# Patient Record
Sex: FEMALE | Race: WHITE | NOT HISPANIC OR LATINO | Employment: UNEMPLOYED | URBAN - METROPOLITAN AREA
[De-identification: names, ages, dates, MRNs, and addresses within clinical notes are randomized per-mention and may not be internally consistent; named-entity substitution may affect disease eponyms.]

---

## 2017-02-10 DIAGNOSIS — Z12.31 OTHER SCREENING MAMMOGRAM: ICD-10-CM

## 2017-05-25 ENCOUNTER — PATIENT OUTREACH (OUTPATIENT)
Dept: ADMINISTRATIVE | Facility: HOSPITAL | Age: 67
End: 2017-05-25

## 2017-05-25 NOTE — LETTER
May 25, 2017    Miltonindira Sondra  [address] [facility] [address]             [facility]  [address]  [place] [address]  Phone: [phone] Dear MsVj Kurtbettie:    [facility] is committed to your overall health.  To help you get the most out of each of your visits, we will review your information to make sure you are up to date on all of your recommended tests and/or procedures.      Dr. Bryson has found that you may be due for a tetanus vaccine, a pneumonia vaccine, a shingles vaccine, a mammogram, a pap smear, a Dexa scan to check for osteoporosis, and a Hepatitis C screening.     If you have had any of the above done at another facility, please bring the records or information with you so that your record at [facility] will be complete.  If you would like to schedule any of these, please contact me.    If you are currently taking medication, please bring it with you to your appointment for review.    Also, if you have any type of Advanced Directives, please bring them with you to your office visit so we may scan them into your chart.        If you have any questions or concerns, please don't hesitate to call.    Sincerely,        Nicole Bhatia LPN Clinical Care Coordinator  [facility]  [phone]

## 2017-06-09 ENCOUNTER — OFFICE VISIT (OUTPATIENT)
Dept: FAMILY MEDICINE | Facility: CLINIC | Age: 67
End: 2017-06-09
Payer: MEDICARE

## 2017-06-09 ENCOUNTER — TELEPHONE (OUTPATIENT)
Dept: FAMILY MEDICINE | Facility: CLINIC | Age: 67
End: 2017-06-09

## 2017-06-09 VITALS
HEIGHT: 64 IN | SYSTOLIC BLOOD PRESSURE: 130 MMHG | WEIGHT: 185.19 LBS | HEART RATE: 80 BPM | RESPIRATION RATE: 18 BRPM | DIASTOLIC BLOOD PRESSURE: 88 MMHG | BODY MASS INDEX: 31.62 KG/M2

## 2017-06-09 DIAGNOSIS — R53.83 FATIGUE, UNSPECIFIED TYPE: Primary | ICD-10-CM

## 2017-06-09 DIAGNOSIS — Z23 NEED FOR PNEUMOCOCCAL VACCINE: ICD-10-CM

## 2017-06-09 DIAGNOSIS — Z12.31 VISIT FOR SCREENING MAMMOGRAM: ICD-10-CM

## 2017-06-09 DIAGNOSIS — M81.0 AGE RELATED OSTEOPOROSIS, UNSPECIFIED PATHOLOGICAL FRACTURE PRESENCE: ICD-10-CM

## 2017-06-09 DIAGNOSIS — Z11.59 ENCOUNTER FOR HEPATITIS C SCREENING TEST FOR LOW RISK PATIENT: ICD-10-CM

## 2017-06-09 LAB — TSH SERPL DL<=0.005 MIU/L-ACNC: 1.78 UIU/ML

## 2017-06-09 PROCEDURE — 99999 PR PBB SHADOW E&M-EST. PATIENT-LVL III: CPT | Mod: PBBFAC,,, | Performed by: FAMILY MEDICINE

## 2017-06-09 PROCEDURE — 84443 ASSAY THYROID STIM HORMONE: CPT

## 2017-06-09 PROCEDURE — 86803 HEPATITIS C AB TEST: CPT

## 2017-06-09 PROCEDURE — 1126F AMNT PAIN NOTED NONE PRSNT: CPT | Mod: S$GLB,,, | Performed by: FAMILY MEDICINE

## 2017-06-09 PROCEDURE — G0009 ADMIN PNEUMOCOCCAL VACCINE: HCPCS | Mod: S$GLB,,, | Performed by: FAMILY MEDICINE

## 2017-06-09 PROCEDURE — 99214 OFFICE O/P EST MOD 30 MIN: CPT | Mod: S$GLB,,, | Performed by: FAMILY MEDICINE

## 2017-06-09 PROCEDURE — 90670 PCV13 VACCINE IM: CPT | Mod: S$GLB,,, | Performed by: FAMILY MEDICINE

## 2017-06-09 PROCEDURE — 1159F MED LIST DOCD IN RCRD: CPT | Mod: S$GLB,,, | Performed by: FAMILY MEDICINE

## 2017-06-09 NOTE — PROGRESS NOTES
Subjective:       Patient ID: Raffi Amaro is a 67 y.o. female.    Chief Complaint: Follow-up    HPI  67 year old female is here for f/u. She says she is feeling great. She has been running and exercising well. She says that in spite of that she is not losing any weight. She feels a little fatigue and is worried about her thyroid.     She has some work to do to get up to date in her health maint. She hasn't had a mammo in years. 19 years since her last pap. No colonoscopy.    PMH, PSH, ALLERGIES, SH, FH reviewed in nurse's notes above  Medications reconciled in the nurse's notes      Review of Systems   Constitutional: Positive for fatigue and unexpected weight change. Negative for chills and fever.   HENT: Negative for congestion, ear pain, postnasal drip, rhinorrhea, sore throat and trouble swallowing.    Eyes: Negative for redness and itching.   Respiratory: Negative for cough, shortness of breath and wheezing.    Cardiovascular: Negative for chest pain and palpitations.   Gastrointestinal: Negative for abdominal pain, diarrhea, nausea and vomiting.   Genitourinary: Negative for dysuria and frequency.   Skin: Negative for rash.   Neurological: Negative for weakness and headaches.       Objective:      Physical Exam   Constitutional: She is oriented to person, place, and time. She appears well-developed. No distress.   HENT:   Head: Normocephalic and atraumatic.   Eyes: Conjunctivae are normal. Pupils are equal, round, and reactive to light.   Neck: Normal range of motion. Neck supple. No thyromegaly present.   Cardiovascular: Normal rate, regular rhythm, normal heart sounds and intact distal pulses.    Pulmonary/Chest: Effort normal and breath sounds normal. No respiratory distress. She has no wheezes.   Abdominal: Soft. Bowel sounds are normal. There is no tenderness.   Musculoskeletal: Normal range of motion. She exhibits no edema.   Lymphadenopathy:     She has no cervical adenopathy.   Neurological: She is  alert and oriented to person, place, and time.   Skin: Skin is warm and dry. No rash noted.   Psychiatric: She has a normal mood and affect. Her behavior is normal.   Nursing note and vitals reviewed.       Assessment/Plan:       Raffi was seen today for follow-up.    Diagnoses and all orders for this visit:    Fatigue, unspecified type  -     TSH; Future    Encounter for hepatitis C screening test for low risk patient  -     Hepatitis C antibody; Future    Visit for screening mammogram  -     Mammo Digital Screening Bilateral With CAD; Future    Age related osteoporosis, unspecified pathological fracture presence  -     DXA Bone Density Spine And Hip; Future    Need for pneumococcal vaccine  -     Pneumococcal Conjugate Vaccine (13 Valent) (IM)    RTC if condition acutely worsens or any other concerns, otherwise RTC as scheduled

## 2017-06-09 NOTE — TELEPHONE ENCOUNTER
Patient would like to have mmg and dexa scan done at [facility]. Orders faxed, patient will call to schedule. Phone number provided to patient.

## 2017-06-12 ENCOUNTER — TELEPHONE (OUTPATIENT)
Dept: FAMILY MEDICINE | Facility: CLINIC | Age: 67
End: 2017-06-12

## 2017-06-12 LAB — HCV AB SERPL QL IA: NEGATIVE

## 2017-06-12 NOTE — TELEPHONE ENCOUNTER
----- Message from Yahaira Bryson MD sent at 6/11/2017 11:25 PM CDT -----  Please notify patient that results have returned, and they are normal. The patient can call with any questions. Otherwise, have them return for next visit as previously scheduled.

## 2017-12-27 ENCOUNTER — IMMUNIZATION (OUTPATIENT)
Dept: FAMILY MEDICINE | Facility: CLINIC | Age: 67
End: 2017-12-27
Payer: MEDICARE

## 2017-12-27 PROCEDURE — 90662 IIV NO PRSV INCREASED AG IM: CPT | Mod: S$GLB,,, | Performed by: FAMILY MEDICINE

## 2017-12-27 PROCEDURE — G0008 ADMIN INFLUENZA VIRUS VAC: HCPCS | Mod: S$GLB,,, | Performed by: FAMILY MEDICINE

## 2018-02-09 DIAGNOSIS — Z12.11 COLON CANCER SCREENING: ICD-10-CM

## 2018-02-23 ENCOUNTER — OFFICE VISIT (OUTPATIENT)
Dept: FAMILY MEDICINE | Facility: CLINIC | Age: 68
End: 2018-02-23
Payer: MEDICARE

## 2018-02-23 VITALS
BODY MASS INDEX: 29.51 KG/M2 | SYSTOLIC BLOOD PRESSURE: 118 MMHG | DIASTOLIC BLOOD PRESSURE: 82 MMHG | HEART RATE: 80 BPM | HEIGHT: 66 IN | TEMPERATURE: 98 F | RESPIRATION RATE: 18 BRPM | WEIGHT: 183.63 LBS

## 2018-02-23 DIAGNOSIS — J01.90 ACUTE SINUSITIS, RECURRENCE NOT SPECIFIED, UNSPECIFIED LOCATION: Primary | ICD-10-CM

## 2018-02-23 PROCEDURE — 3008F BODY MASS INDEX DOCD: CPT | Mod: S$GLB,,, | Performed by: FAMILY MEDICINE

## 2018-02-23 PROCEDURE — 99213 OFFICE O/P EST LOW 20 MIN: CPT | Mod: S$GLB,,, | Performed by: FAMILY MEDICINE

## 2018-02-23 PROCEDURE — 99999 PR PBB SHADOW E&M-EST. PATIENT-LVL III: CPT | Mod: PBBFAC,,, | Performed by: FAMILY MEDICINE

## 2018-02-23 PROCEDURE — 1126F AMNT PAIN NOTED NONE PRSNT: CPT | Mod: S$GLB,,, | Performed by: FAMILY MEDICINE

## 2018-02-23 PROCEDURE — 1159F MED LIST DOCD IN RCRD: CPT | Mod: S$GLB,,, | Performed by: FAMILY MEDICINE

## 2018-02-23 RX ORDER — CETIRIZINE HYDROCHLORIDE 10 MG/1
10 TABLET ORAL DAILY
Qty: 30 TABLET | Refills: 5 | Status: SHIPPED | OUTPATIENT
Start: 2018-02-23 | End: 2019-01-09

## 2018-02-23 RX ORDER — AZITHROMYCIN 250 MG/1
TABLET, FILM COATED ORAL
Qty: 6 TABLET | Refills: 0 | Status: SHIPPED | OUTPATIENT
Start: 2018-02-23 | End: 2018-03-09

## 2018-02-23 RX ORDER — BENZONATATE 200 MG/1
200 CAPSULE ORAL 3 TIMES DAILY PRN
Qty: 20 CAPSULE | Refills: 2 | Status: SHIPPED | OUTPATIENT
Start: 2018-02-23 | End: 2018-03-02

## 2018-02-23 NOTE — PROGRESS NOTES
Chief complaint: Sinus congestion, cough    History of present illness: Pt is 68 y.o. female complaints of sinus congestion, facial pressure, sore throat, ear ache.  Patient states glands are swollen and neck.  Patient has a cough.  This started 5 days ago.  Patient denies chest pain, shortness of breath, fever.  Patient has itchy watery eyes, itchy scratchy throat.  The patient complains of decreased hearing.  Cough is nonproductive    Review of systems:  Constitutional-no weight loss, weight gain  HEENT-allergy symptoms such as itchy watery eyes, post nasal drip, itchy palate, come and go.  Respiratory-no wheezing, see history of present illness  Neurological-no weakness or numbness    Past medical history, family history, social history-same as note dated today    Medications-all reviewed and verified in nurses notes.    Physical exam: Vital signs-reviewed and verified in nurses notes  Gen.-alert, oriented, no apparent distress.  Coughing.  Head-positive facial tenderness over the frontal and maxillary sinuses  Eyes: Pupils equal round reactive to light and accommodation, extraocular muscles intact, conjunctiva clear  Ears: Tympanic membranes are clear and mobile, no fluid present.  Nose: Injected mucous membranes, erythematous, mucopurulent discharge  Throat:  Injected red streaky mucosa,  tonsils normal  Neck: Shotty, tender anterior lymphadenopathy  Heart: Regular rate and rhythm, no murmurs, rubs or gallops  Lungs:Lungs were clear to auscultation and percussion, and with normal diaphragmatic excursion. No wheezes or rales were noted.     Assessment/Plan:   Acute sinusitis, recurrence not specified, unspecified location  -     azithromycin (Z-YULIA) 250 MG tablet; 2 po day 1, then 1 po q day  Dispense: 6 tablet; Refill: 0  -     cetirizine (ZYRTEC) 10 MG tablet; Take 1 tablet (10 mg total) by mouth once daily.  Dispense: 30 tablet; Refill: 5  -     benzonatate (TESSALON) 200 MG capsule; Take 1 capsule (200 mg  total) by mouth 3 (three) times daily as needed for Cough.  Dispense: 20 capsule; Refill: 2      Sinusitis, acute  - azithromycin (ZITHROMAX) 500 MG tablet; Take 1 tablet (500 mg total) by mouth once daily.  - cetirizine (ZYRTEC) 10 MG tablet; Take 1 tablet (10 mg total) by mouth once daily.  - diphenhydrAMINE (BENADRYL) 25 mg capsule; Take 1 each (25 mg total) by mouth nightly as needed for Itching.  - methylPREDNISolone acetate injection 60 mg; Inject 1.5 mLs (60 mg total) into the muscle one time.    Simply saline nasal lavage q.2 to 3 hours p.r.n.  Avoid dust, allergens, other sinus irritants.  Handwashing technique discussed to prevent spreading germs.

## 2018-03-09 ENCOUNTER — OFFICE VISIT (OUTPATIENT)
Dept: FAMILY MEDICINE | Facility: CLINIC | Age: 68
End: 2018-03-09
Payer: MEDICARE

## 2018-03-09 ENCOUNTER — APPOINTMENT (OUTPATIENT)
Dept: RADIOLOGY | Facility: CLINIC | Age: 68
End: 2018-03-09
Attending: FAMILY MEDICINE
Payer: MEDICARE

## 2018-03-09 VITALS
SYSTOLIC BLOOD PRESSURE: 136 MMHG | RESPIRATION RATE: 20 BRPM | DIASTOLIC BLOOD PRESSURE: 86 MMHG | HEIGHT: 66 IN | TEMPERATURE: 98 F | WEIGHT: 184.5 LBS | HEART RATE: 72 BPM | BODY MASS INDEX: 29.65 KG/M2

## 2018-03-09 DIAGNOSIS — E78.2 ELEVATED TRIGLYCERIDES WITH HIGH CHOLESTEROL: ICD-10-CM

## 2018-03-09 DIAGNOSIS — Z01.419 WELL WOMAN EXAM: ICD-10-CM

## 2018-03-09 DIAGNOSIS — R05.9 COUGH: Primary | ICD-10-CM

## 2018-03-09 DIAGNOSIS — R05.9 COUGH: ICD-10-CM

## 2018-03-09 DIAGNOSIS — Z78.0 POST-MENOPAUSAL: ICD-10-CM

## 2018-03-09 DIAGNOSIS — Z13.220 SCREENING, LIPID: ICD-10-CM

## 2018-03-09 LAB
ALBUMIN SERPL BCP-MCNC: 4.2 G/DL
ALP SERPL-CCNC: 98 U/L
ALT SERPL W/O P-5'-P-CCNC: 28 U/L
ANION GAP SERPL CALC-SCNC: 10 MMOL/L
AST SERPL-CCNC: 31 U/L
BASOPHILS # BLD AUTO: 0.03 K/UL
BASOPHILS NFR BLD: 0.6 %
BILIRUB SERPL-MCNC: 1 MG/DL
BUN SERPL-MCNC: 8 MG/DL
CALCIUM SERPL-MCNC: 9.5 MG/DL
CHLORIDE SERPL-SCNC: 106 MMOL/L
CHOLEST SERPL-MCNC: 200 MG/DL
CHOLEST/HDLC SERPL: 3.8 {RATIO}
CO2 SERPL-SCNC: 27 MMOL/L
CREAT SERPL-MCNC: 0.9 MG/DL
DIFFERENTIAL METHOD: NORMAL
EOSINOPHIL # BLD AUTO: 0.1 K/UL
EOSINOPHIL NFR BLD: 2.9 %
ERYTHROCYTE [DISTWIDTH] IN BLOOD BY AUTOMATED COUNT: 13 %
EST. GFR  (AFRICAN AMERICAN): >60 ML/MIN/1.73 M^2
EST. GFR  (NON AFRICAN AMERICAN): >60 ML/MIN/1.73 M^2
GLUCOSE SERPL-MCNC: 95 MG/DL
HCT VFR BLD AUTO: 42.9 %
HDLC SERPL-MCNC: 53 MG/DL
HDLC SERPL: 26.5 %
HGB BLD-MCNC: 14.1 G/DL
LDLC SERPL CALC-MCNC: 121.6 MG/DL
LYMPHOCYTES # BLD AUTO: 1.5 K/UL
LYMPHOCYTES NFR BLD: 30 %
MCH RBC QN AUTO: 28.7 PG
MCHC RBC AUTO-ENTMCNC: 32.9 G/DL
MCV RBC AUTO: 87 FL
MONOCYTES # BLD AUTO: 0.4 K/UL
MONOCYTES NFR BLD: 8.5 %
NEUTROPHILS # BLD AUTO: 2.8 K/UL
NEUTROPHILS NFR BLD: 58 %
NONHDLC SERPL-MCNC: 147 MG/DL
PLATELET # BLD AUTO: 235 K/UL
PMV BLD AUTO: 10.7 FL
POTASSIUM SERPL-SCNC: 4 MMOL/L
PROT SERPL-MCNC: 7.3 G/DL
RBC # BLD AUTO: 4.92 M/UL
SODIUM SERPL-SCNC: 143 MMOL/L
TRIGL SERPL-MCNC: 127 MG/DL
WBC # BLD AUTO: 4.83 K/UL

## 2018-03-09 PROCEDURE — 85025 COMPLETE CBC W/AUTO DIFF WBC: CPT

## 2018-03-09 PROCEDURE — 71046 X-RAY EXAM CHEST 2 VIEWS: CPT | Mod: TC,PO

## 2018-03-09 PROCEDURE — 3075F SYST BP GE 130 - 139MM HG: CPT | Mod: S$GLB,,, | Performed by: FAMILY MEDICINE

## 2018-03-09 PROCEDURE — 3079F DIAST BP 80-89 MM HG: CPT | Mod: S$GLB,,, | Performed by: FAMILY MEDICINE

## 2018-03-09 PROCEDURE — 36415 COLL VENOUS BLD VENIPUNCTURE: CPT | Mod: S$GLB,,, | Performed by: FAMILY MEDICINE

## 2018-03-09 PROCEDURE — 99999 PR PBB SHADOW E&M-EST. PATIENT-LVL III: CPT | Mod: PBBFAC,,, | Performed by: FAMILY MEDICINE

## 2018-03-09 PROCEDURE — 99214 OFFICE O/P EST MOD 30 MIN: CPT | Mod: S$GLB,,, | Performed by: FAMILY MEDICINE

## 2018-03-09 PROCEDURE — 80061 LIPID PANEL: CPT

## 2018-03-09 PROCEDURE — 71046 X-RAY EXAM CHEST 2 VIEWS: CPT | Mod: 26,,, | Performed by: RADIOLOGY

## 2018-03-09 PROCEDURE — 80053 COMPREHEN METABOLIC PANEL: CPT

## 2018-03-09 RX ORDER — FLUTICASONE PROPIONATE 50 MCG
2 SPRAY, SUSPENSION (ML) NASAL DAILY
Qty: 1 BOTTLE | Refills: 4 | Status: SHIPPED | OUTPATIENT
Start: 2018-03-09 | End: 2018-04-08

## 2018-03-09 RX ORDER — AZELASTINE 1 MG/ML
1-2 SPRAY, METERED NASAL 2 TIMES DAILY
Qty: 30 ML | Refills: 5 | Status: SHIPPED | OUTPATIENT
Start: 2018-03-09 | End: 2019-01-09

## 2018-03-09 RX ORDER — BENZONATATE 200 MG/1
CAPSULE ORAL
COMMUNITY
Start: 2018-03-03 | End: 2019-01-09

## 2018-03-09 NOTE — PROGRESS NOTES
Subjective:       Patient ID: Raffi Amaro is a 68 y.o. female.    Chief Complaint: Follow-up (cough)    HPI  68 year old female comes in with c/o cough. She is not a smoker and has never been a smoker. She started with this months ago. She was seen by Dr. Brandt and was given azithromycin and allergy meds for a 'pink throat.' She did have a wheeze which has improved. She can't take cough drops.    She says that she has been caring for her brother and has been putting herself to the back burner. She is agitated today and says this is why her BP is high.    PMH, PSH, ALLERGIES, SH, FH reviewed in nurse's notes above  Medications reconciled in the nurse's notes      Review of Systems   Constitutional: Negative for chills and fever.   HENT: Negative for congestion, ear pain, postnasal drip, rhinorrhea, sore throat and trouble swallowing.    Eyes: Negative for redness and itching.   Respiratory: Positive for cough. Negative for shortness of breath and wheezing.    Cardiovascular: Negative for chest pain and palpitations.   Gastrointestinal: Negative for abdominal pain, diarrhea, nausea and vomiting.   Genitourinary: Negative for dysuria and frequency.   Skin: Negative for rash.   Neurological: Negative for weakness and headaches.       Objective:      Physical Exam   Constitutional: She is oriented to person, place, and time. She appears well-developed. No distress.   HENT:   Head: Normocephalic and atraumatic.   Eyes: Conjunctivae are normal. Pupils are equal, round, and reactive to light.   Neck: Normal range of motion. Neck supple. No thyromegaly present.   Cardiovascular: Normal rate, regular rhythm, normal heart sounds and intact distal pulses.    Pulmonary/Chest: Effort normal and breath sounds normal. No respiratory distress. She has no wheezes.   Abdominal: Soft. Bowel sounds are normal. There is no tenderness.   Musculoskeletal: Normal range of motion. She exhibits no edema.   Lymphadenopathy:     She has no  cervical adenopathy.   Neurological: She is alert and oriented to person, place, and time.   Skin: Skin is warm and dry. No rash noted.   Psychiatric: She has a normal mood and affect. Her behavior is normal.   Nursing note and vitals reviewed.       Assessment/Plan:       Problem List Items Addressed This Visit     None      Visit Diagnoses     Cough    -  Primary    Relevant Medications    fluticasone (FLONASE) 50 mcg/actuation nasal spray    azelastine (ASTELIN) 137 mcg (0.1 %) nasal spray    Other Relevant Orders    X-Ray Chest PA And Lateral    CBC auto differential    Well woman exam        Relevant Orders    Comprehensive metabolic panel    Screening, lipid        Elevated triglycerides with high cholesterol        Relevant Orders    Lipid panel      RTC if condition acutely worsens or any other concerns, otherwise RTC as scheduled    Will monitor BPs - if elevated this week will let me know. We have discussed that she may need to be treated. Will watch diet and watch stressors for now.    CXR okay.

## 2018-03-16 ENCOUNTER — TELEPHONE (OUTPATIENT)
Dept: FAMILY MEDICINE | Facility: CLINIC | Age: 68
End: 2018-03-16

## 2018-03-16 ENCOUNTER — CLINICAL SUPPORT (OUTPATIENT)
Dept: FAMILY MEDICINE | Facility: CLINIC | Age: 68
End: 2018-03-16
Payer: MEDICARE

## 2018-03-16 NOTE — TELEPHONE ENCOUNTER
Please let her know that this is borderline.  I will NOT put her on a BP med at this point, BUT we need to keep an eye on it like we disucssed.  mh

## 2018-04-05 ENCOUNTER — TELEPHONE (OUTPATIENT)
Dept: FAMILY MEDICINE | Facility: CLINIC | Age: 68
End: 2018-04-05

## 2018-04-05 NOTE — TELEPHONE ENCOUNTER
Patient states that she is coughing excessively,running a fever,weakness states that she thought she was walking to the bathroom ended up on the floor.Patient states that  told her next time this happens to just come in ,however patient states that she can not drive right now .Patient would like to come in on 4/6/2018 if possible.Please review and advise.Thank you.

## 2018-04-06 ENCOUNTER — OFFICE VISIT (OUTPATIENT)
Dept: FAMILY MEDICINE | Facility: CLINIC | Age: 68
End: 2018-04-06
Payer: MEDICARE

## 2018-04-06 VITALS
HEART RATE: 84 BPM | WEIGHT: 181.19 LBS | SYSTOLIC BLOOD PRESSURE: 122 MMHG | RESPIRATION RATE: 18 BRPM | DIASTOLIC BLOOD PRESSURE: 80 MMHG | BODY MASS INDEX: 29.12 KG/M2 | HEIGHT: 66 IN

## 2018-04-06 DIAGNOSIS — J06.9 UPPER RESPIRATORY TRACT INFECTION, UNSPECIFIED TYPE: Primary | ICD-10-CM

## 2018-04-06 PROCEDURE — 99999 PR PBB SHADOW E&M-EST. PATIENT-LVL III: CPT | Mod: PBBFAC,,, | Performed by: FAMILY MEDICINE

## 2018-04-06 PROCEDURE — 99213 OFFICE O/P EST LOW 20 MIN: CPT | Mod: S$GLB,,, | Performed by: FAMILY MEDICINE

## 2018-04-06 PROCEDURE — 3079F DIAST BP 80-89 MM HG: CPT | Mod: CPTII,S$GLB,, | Performed by: FAMILY MEDICINE

## 2018-04-06 PROCEDURE — 3074F SYST BP LT 130 MM HG: CPT | Mod: CPTII,S$GLB,, | Performed by: FAMILY MEDICINE

## 2018-04-06 RX ORDER — BENZONATATE 100 MG/1
100 CAPSULE ORAL 3 TIMES DAILY PRN
Qty: 30 CAPSULE | Refills: 0 | Status: SHIPPED | OUTPATIENT
Start: 2018-04-06 | End: 2018-05-06

## 2018-04-06 RX ORDER — PROMETHAZINE HYDROCHLORIDE AND CODEINE PHOSPHATE 6.25; 1 MG/5ML; MG/5ML
5 SOLUTION ORAL EVERY 4 HOURS PRN
Qty: 120 ML | Refills: 0 | Status: SHIPPED | OUTPATIENT
Start: 2018-04-06 | End: 2018-04-16

## 2018-04-06 NOTE — PROGRESS NOTES
Subjective:       Patient ID: Raffi Amaro is a 68 y.o. female.    Chief Complaint: Nasal Congestion; Sore Throat; and Cough    HPI  68 year old female comes in after a fall yesterday. She says that she was sitting on the front porch and had a lightheaded feeling. She got up to go to the bathroom and felt like she was off balanced. She heard herself fall. She doesn't think she completely lost consciousness. She was able to get up quickly and had no confusion afterwards.     She also has some congestion. She has stopped using her nighttime nasal spray recently with her 's hospitalization but has been taking her AM nasal spray.    She has a productive cough but no fevers.     She has been checking her pulse ox and blood pressures as well as heart rates - these have been normal.    PMH, PSH, ALLERGIES, SH, FH reviewed in nurse's notes above  Medications reconciled in the nurse's notes      Review of Systems   Constitutional: Negative for chills and fever.   HENT: Positive for congestion and sore throat. Negative for ear pain, postnasal drip, rhinorrhea and trouble swallowing.    Eyes: Negative for redness and itching.   Respiratory: Positive for cough. Negative for shortness of breath and wheezing.    Cardiovascular: Negative for chest pain and palpitations.   Gastrointestinal: Negative for abdominal pain, diarrhea, nausea and vomiting.   Genitourinary: Negative for dysuria and frequency.   Skin: Negative for rash.   Neurological: Positive for light-headedness. Negative for weakness and headaches.       Objective:      Physical Exam   Constitutional: She is oriented to person, place, and time. She appears well-developed. No distress.   HENT:   Head: Normocephalic and atraumatic.   Nasal congestion   Eyes: Conjunctivae are normal. Pupils are equal, round, and reactive to light.   Neck: Normal range of motion. Neck supple. No thyromegaly present.   Cardiovascular: Normal rate, regular rhythm, normal heart  sounds and intact distal pulses.    Pulmonary/Chest: Effort normal and breath sounds normal. No respiratory distress. She has no wheezes.   Abdominal: Soft. Bowel sounds are normal. There is no tenderness.   Musculoskeletal: Normal range of motion. She exhibits no edema.   Lymphadenopathy:     She has no cervical adenopathy.   Neurological: She is alert and oriented to person, place, and time.   Skin: Skin is warm and dry. No rash noted.   Psychiatric: She has a normal mood and affect. Her behavior is normal.   Nursing note and vitals reviewed.       Assessment/Plan:       Problem List Items Addressed This Visit     None      Visit Diagnoses     Upper respiratory tract infection, unspecified type    -  Primary    Relevant Medications    benzonatate (TESSALON) 100 MG capsule    promethazine-codeine 6.25-10 mg/5 ml (PHENERGAN WITH CODEINE) 6.25-10 mg/5 mL syrup      claritin d for 5 days, continue nose sprays.  Cough meds to pharmacy.    RTC if condition acutely worsens or any other concerns, otherwise RTC as scheduled

## 2018-04-09 ENCOUNTER — TELEPHONE (OUTPATIENT)
Dept: FAMILY MEDICINE | Facility: CLINIC | Age: 68
End: 2018-04-09

## 2018-04-09 NOTE — TELEPHONE ENCOUNTER
----- Message from Alejandra Gamez sent at 2018  9:59 AM CDT -----  Contact: Self  Raffi Amaro  MRN: [id number]  : 1950  PCP: Yahaira Bryson  Home Phone      [phone]  Work Phone      Not on file.  Mobile          [phone]    MESSAGE:   Calling to let Dr. Bryson know that she is feeling much better and still taking her meds.  Dr. Bryson told her to call in and let her know how she was doing today.    Phone:  [phone]

## 2019-01-09 ENCOUNTER — HOSPITAL ENCOUNTER (OUTPATIENT)
Dept: RADIOLOGY | Facility: HOSPITAL | Age: 69
Discharge: HOME | End: 2019-01-09
Attending: FAMILY MEDICINE
Payer: MEDICARE

## 2019-01-09 ENCOUNTER — OFFICE VISIT (OUTPATIENT)
Dept: FAMILY MEDICINE | Facility: CLINIC | Age: 69
End: 2019-01-09
Payer: MEDICARE

## 2019-01-09 VITALS — HEART RATE: 74 BPM | RESPIRATION RATE: 16 BRPM | SYSTOLIC BLOOD PRESSURE: 136 MMHG | DIASTOLIC BLOOD PRESSURE: 82 MMHG

## 2019-01-09 DIAGNOSIS — M79.671 RIGHT FOOT PAIN: ICD-10-CM
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
## 2019-01-09 PROCEDURE — 99213 OFFICE O/P EST LOW 20 MIN: CPT | Mod: 25,S$GLB,, | Performed by: FAMILY MEDICINE

## 2019-01-09 PROCEDURE — G0008 FLU VACCINE - HIGH DOSE (65+) PRESERVATIVE FREE IM: ICD-10-PCS | Mod: S$GLB,,, | Performed by: FAMILY MEDICINE

## 2019-01-09 PROCEDURE — 3079F DIAST BP 80-89 MM HG: CPT | Mod: CPTII,S$GLB,, | Performed by: FAMILY MEDICINE

## 2019-01-09 PROCEDURE — 99999 PR PBB SHADOW E&M-EST. PATIENT-LVL II: CPT | Mod: PBBFAC,,, | Performed by: FAMILY MEDICINE

## 2019-01-09 PROCEDURE — 90662 IIV NO PRSV INCREASED AG IM: CPT | Mod: S$GLB,,, | Performed by: FAMILY MEDICINE

## 2019-01-09 PROCEDURE — G0008 ADMIN INFLUENZA VIRUS VAC: HCPCS | Mod: S$GLB,,, | Performed by: FAMILY MEDICINE

## 2019-01-09 PROCEDURE — 3079F PR MOST RECENT DIASTOLIC BLOOD PRESSURE 80-89 MM HG: ICD-10-PCS | Mod: CPTII,S$GLB,, | Performed by: FAMILY MEDICINE

## 2019-01-09 PROCEDURE — 90662 FLU VACCINE - HIGH DOSE (65+) PRESERVATIVE FREE IM: ICD-10-PCS | Mod: S$GLB,,, | Performed by: FAMILY MEDICINE

## 2019-01-09 PROCEDURE — 1101F PT FALLS ASSESS-DOCD LE1/YR: CPT | Mod: CPTII,S$GLB,, | Performed by: FAMILY MEDICINE

## 2019-01-09 PROCEDURE — 3075F SYST BP GE 130 - 139MM HG: CPT | Mod: CPTII,S$GLB,, | Performed by: FAMILY MEDICINE

## 2019-01-09 PROCEDURE — 99999 PR PBB SHADOW E&M-EST. PATIENT-LVL II: ICD-10-PCS | Mod: PBBFAC,,, | Performed by: FAMILY MEDICINE

## 2019-01-09 PROCEDURE — 3075F PR MOST RECENT SYSTOLIC BLOOD PRESS GE 130-139MM HG: ICD-10-PCS | Mod: CPTII,S$GLB,, | Performed by: FAMILY MEDICINE

## 2019-01-09 PROCEDURE — 1101F PR PT FALLS ASSESS DOC 0-1 FALLS W/OUT INJ PAST YR: ICD-10-PCS | Mod: CPTII,S$GLB,, | Performed by: FAMILY MEDICINE

## 2019-01-09 PROCEDURE — 99213 PR OFFICE/OUTPT VISIT, EST, LEVL III, 20-29 MIN: ICD-10-PCS | Mod: 25,S$GLB,, | Performed by: FAMILY MEDICINE

## 2019-01-09 PROCEDURE — 73630 X-RAY EXAM OF FOOT: CPT | Mod: TC,RT

## 2019-01-09 NOTE — PROGRESS NOTES
Subjective:       Patient ID: Raffi Amaro is a 68 y.o. female.    Chief Complaint: No chief complaint on file.    Pt is a 68 y.o. female who presents for evaluation and management of   Encounter Diagnoses   Name Primary?    Fall, initial encounter Yes    Right foot pain    .Fell 2 days ago from ladder. About 2 foot fall. Right foot got caught in ladder and she fell on left side.   She is having pain and swelling dorsum right foot since     Doing well on current meds. Denies any side effects. Prevention is up to date.  Review of Systems   Musculoskeletal: Positive for gait problem and joint swelling.       Objective:      Physical Exam   Constitutional: She is oriented to person, place, and time. She appears well-developed and well-nourished.   HENT:   Head: Normocephalic and atraumatic.   Right Ear: External ear normal.   Left Ear: External ear normal.   Nose: Nose normal.   Eyes: EOM are normal. Pupils are equal, round, and reactive to light.   Neck: Normal range of motion. Neck supple. No JVD present. No tracheal deviation present. No thyromegaly present.   Cardiovascular: Normal rate.   Pulmonary/Chest: Effort normal. No respiratory distress.   Abdominal: Soft.   Musculoskeletal: Normal range of motion. She exhibits tenderness. She exhibits no edema.   No ankle pain with ROM  TTP and swelling over dorsum of right foot.     Lymphadenopathy:     She has no cervical adenopathy.   Neurological: She is alert and oriented to person, place, and time.   Skin: Skin is warm and dry. No rash noted. No erythema. No pallor.   Psychiatric: She has a normal mood and affect. Her behavior is normal. Judgment and thought content normal.       Assessment:       1. Fall, initial encounter    2. Right foot pain        Plan:   Diagnoses and all orders for this visit:    Fall, initial encounter  -     X-Ray Foot Complete Right; Future    Right foot pain  -     X-Ray Foot Complete Right; Future    Other orders  -     Influenza -  High Dose (65+) (PF) (IM)      Problem List Items Addressed This Visit     None      Visit Diagnoses     Fall, initial encounter    -  Primary    Relevant Orders    X-Ray Foot Complete Right    Right foot pain        Relevant Orders    X-Ray Foot Complete Right      ICE,  Elevation.  Will f/u xray   She needs crutches. She can't bare weight on foot     No Follow-up on file.

## 2019-01-10 ENCOUNTER — TELEPHONE (OUTPATIENT)
Dept: FAMILY MEDICINE | Facility: CLINIC | Age: 69
End: 2019-01-10

## 2019-01-10 DIAGNOSIS — S93.602A SPRAIN OF LEFT FOOT, INITIAL ENCOUNTER: Primary | ICD-10-CM

## 2019-01-10 NOTE — TELEPHONE ENCOUNTER
Patient requesting crutches to be ordered----- Message from Garret Cochran MD sent at 1/9/2019  6:23 PM CST -----  Test results normal  Please let her know this is a foot sprain. No broken bones. Thanks

## 2019-01-10 NOTE — TELEPHONE ENCOUNTER
Please addend last office, must state patient needs crutches to ambulate along with valid diagnosis.       Once note completed, will fax order, registration, and note to [facility].    [facility]  Phone: [phone]  Fax: [phone]

## 2019-01-15 ENCOUNTER — TELEPHONE (OUTPATIENT)
Dept: FAMILY MEDICINE | Facility: CLINIC | Age: 69
End: 2019-01-15

## 2019-01-15 DIAGNOSIS — M81.0 AGE-RELATED OSTEOPOROSIS WITHOUT CURRENT PATHOLOGICAL FRACTURE: ICD-10-CM

## 2019-01-15 RX ORDER — ALENDRONATE SODIUM 70 MG/1
70 TABLET ORAL
Qty: 4 TABLET | Refills: 11 | Status: SHIPPED | OUTPATIENT
Start: 2019-01-15 | End: 2019-01-28 | Stop reason: SDUPTHER

## 2019-01-15 NOTE — TELEPHONE ENCOUNTER
Please elt patient know that [facility]  It looks like she has osteoporosis.  Aside from doing weight bearing exercise and starting to take in 800IU of Vit D and 1200mg of calcium per day (mostly dietary if possible), she should also start on the prescription that I called in.  If she has any questions, please schedule an appointment for her.  ABDULKADIR

## 2019-01-28 NOTE — TELEPHONE ENCOUNTER
----- Message from Antonella Tong sent at 2019 11:08 AM CST -----  Contact: Self  Raffi Amaro  MRN: [id number]  : 1950  PCP: Yahaira Bryson  Home Phone      [phone]  Work Phone      Not on file.  Mobile          [phone]      MESSAGE:   Pt requesting refill or new Rx.   Is this a refill or new RX:  refill  RX name and strength: alendronate (FOSAMAX) 70 MG tablet  Last office visit:   Is this a 30-day or 90-day RX:    Pharmacy name and location:  [facility]  Comments:      Phone:  [phone]

## 2019-01-29 RX ORDER — ALENDRONATE SODIUM 70 MG/1
70 TABLET ORAL
Qty: 4 TABLET | Refills: 11 | Status: SHIPPED | OUTPATIENT
Start: 2019-01-29 | End: 2019-07-24 | Stop reason: SDUPTHER

## 2019-02-15 DIAGNOSIS — Z12.11 COLON CANCER SCREENING: ICD-10-CM

## 2019-03-08 DIAGNOSIS — Z12.11 COLON CANCER SCREENING: ICD-10-CM

## 2019-03-26 ENCOUNTER — TELEPHONE (OUTPATIENT)
Dept: FAMILY MEDICINE | Facility: CLINIC | Age: 69
End: 2019-03-26

## 2019-03-26 DIAGNOSIS — M10.9 GOUT, UNSPECIFIED CAUSE, UNSPECIFIED CHRONICITY, UNSPECIFIED SITE: Primary | ICD-10-CM

## 2019-03-26 NOTE — TELEPHONE ENCOUNTER
----- Message from Antonella Tong sent at 3/26/2019  5:42 PM CDT -----  Contact: Self  Raffi Amaro  MRN: [id number]  : 1950  PCP: Yahaira Bryson  Home Phone      [phone]  Work Phone      Not on file.  Mobile          [phone]      MESSAGE:   Patient requesting orders  Name of test (labs, mammo, x-ray, etc.) bw  Where is test to be performed:[facility]  Date of test (if scheduled):   Reason (be specific): patient believes she has gout  Current Insurance:   Comments:      Phone: [phone]

## 2019-03-29 ENCOUNTER — CLINICAL SUPPORT (OUTPATIENT)
Dept: FAMILY MEDICINE | Facility: CLINIC | Age: 69
End: 2019-03-29
Payer: MEDICARE

## 2019-03-29 DIAGNOSIS — M10.9 GOUT, UNSPECIFIED CAUSE, UNSPECIFIED CHRONICITY, UNSPECIFIED SITE: ICD-10-CM

## 2019-03-29 LAB
ANION GAP SERPL CALC-SCNC: 7 MMOL/L (ref 8–16)
BUN SERPL-MCNC: 9 MG/DL (ref 8–23)
CALCIUM SERPL-MCNC: 9.5 MG/DL (ref 8.7–10.5)
CHLORIDE SERPL-SCNC: 108 MMOL/L (ref 95–110)
CO2 SERPL-SCNC: 27 MMOL/L (ref 23–29)
CREAT SERPL-MCNC: 0.8 MG/DL (ref 0.5–1.4)
EST. GFR  (AFRICAN AMERICAN): >60 ML/MIN/1.73 M^2
EST. GFR  (NON AFRICAN AMERICAN): >60 ML/MIN/1.73 M^2
GLUCOSE SERPL-MCNC: 91 MG/DL (ref 70–110)
POTASSIUM SERPL-SCNC: 4.1 MMOL/L (ref 3.5–5.1)
SODIUM SERPL-SCNC: 142 MMOL/L (ref 136–145)
URATE SERPL-MCNC: 5.1 MG/DL (ref 2.4–5.7)

## 2019-03-29 PROCEDURE — 36415 COLL VENOUS BLD VENIPUNCTURE: CPT | Mod: S$GLB,,, | Performed by: FAMILY MEDICINE

## 2019-03-29 PROCEDURE — 84550 ASSAY OF BLOOD/URIC ACID: CPT

## 2019-03-29 PROCEDURE — 80048 BASIC METABOLIC PNL TOTAL CA: CPT

## 2019-03-29 PROCEDURE — 36415 PR COLLECTION VENOUS BLOOD,VENIPUNCTURE: ICD-10-PCS | Mod: S$GLB,,, | Performed by: FAMILY MEDICINE

## 2019-04-24 ENCOUNTER — OFFICE VISIT (OUTPATIENT)
Dept: FAMILY MEDICINE | Facility: CLINIC | Age: 69
End: 2019-04-24
Payer: MEDICARE

## 2019-04-24 VITALS
BODY MASS INDEX: 30.57 KG/M2 | WEIGHT: 190.19 LBS | HEART RATE: 84 BPM | HEIGHT: 66 IN | RESPIRATION RATE: 18 BRPM | DIASTOLIC BLOOD PRESSURE: 82 MMHG | SYSTOLIC BLOOD PRESSURE: 134 MMHG

## 2019-04-24 DIAGNOSIS — Z23 NEED FOR VACCINATION: ICD-10-CM

## 2019-04-24 DIAGNOSIS — M72.2 PLANTAR FASCIITIS, LEFT: ICD-10-CM

## 2019-04-24 DIAGNOSIS — M79.672 PAIN OF LEFT HEEL: Primary | ICD-10-CM

## 2019-04-24 PROCEDURE — 3075F PR MOST RECENT SYSTOLIC BLOOD PRESS GE 130-139MM HG: ICD-10-PCS | Mod: CPTII,S$GLB,, | Performed by: FAMILY MEDICINE

## 2019-04-24 PROCEDURE — 99999 PR PBB SHADOW E&M-EST. PATIENT-LVL III: CPT | Mod: PBBFAC,,, | Performed by: FAMILY MEDICINE

## 2019-04-24 PROCEDURE — 99213 PR OFFICE/OUTPT VISIT, EST, LEVL III, 20-29 MIN: ICD-10-PCS | Mod: S$GLB,,, | Performed by: FAMILY MEDICINE

## 2019-04-24 PROCEDURE — 3075F SYST BP GE 130 - 139MM HG: CPT | Mod: CPTII,S$GLB,, | Performed by: FAMILY MEDICINE

## 2019-04-24 PROCEDURE — 99999 PR PBB SHADOW E&M-EST. PATIENT-LVL III: ICD-10-PCS | Mod: PBBFAC,,, | Performed by: FAMILY MEDICINE

## 2019-04-24 PROCEDURE — 1101F PT FALLS ASSESS-DOCD LE1/YR: CPT | Mod: CPTII,S$GLB,, | Performed by: FAMILY MEDICINE

## 2019-04-24 PROCEDURE — 99213 OFFICE O/P EST LOW 20 MIN: CPT | Mod: S$GLB,,, | Performed by: FAMILY MEDICINE

## 2019-04-24 PROCEDURE — 3079F PR MOST RECENT DIASTOLIC BLOOD PRESSURE 80-89 MM HG: ICD-10-PCS | Mod: CPTII,S$GLB,, | Performed by: FAMILY MEDICINE

## 2019-04-24 PROCEDURE — 3079F DIAST BP 80-89 MM HG: CPT | Mod: CPTII,S$GLB,, | Performed by: FAMILY MEDICINE

## 2019-04-24 PROCEDURE — 1101F PR PT FALLS ASSESS DOC 0-1 FALLS W/OUT INJ PAST YR: ICD-10-PCS | Mod: CPTII,S$GLB,, | Performed by: FAMILY MEDICINE

## 2019-04-24 RX ORDER — ACETAMINOPHEN 500 MG
TABLET ORAL
COMMUNITY
Start: 2019-04-23

## 2019-04-24 RX ORDER — DICLOFENAC SODIUM 10 MG/G
2 GEL TOPICAL 4 TIMES DAILY
Qty: 3 TUBE | Refills: 5 | Status: SHIPPED | OUTPATIENT
Start: 2019-04-24 | End: 2022-06-15

## 2019-04-24 RX ORDER — CHOLECALCIFEROL (VITAMIN D3) 25 MCG
1000 TABLET ORAL DAILY
COMMUNITY

## 2019-04-24 NOTE — PROGRESS NOTES
Subjective:       Patient ID: Raffi Amaro is a 69 y.o. female.    Chief Complaint: Foot Pain (x3 mths)    HPI  69 year old female comes in for f/u of her dexa scan. She also notes evan tshadam has been having pain in her heel. She notes that her pain is in her heel and worse with walking. She also notes that she has improvement with her tennis shoes and specific shoes. She also has pain in the middle of her foot.    PMH, PSH, ALLERGIES, SH, FH reviewed in nurse's notes above  Medications reconciled in the nurse's notes      Review of Systems   Constitutional: Negative for chills and fever.   HENT: Negative for congestion, ear pain, postnasal drip, rhinorrhea, sore throat and trouble swallowing.    Eyes: Negative for redness and itching.   Respiratory: Negative for cough, shortness of breath and wheezing.    Cardiovascular: Negative for chest pain and palpitations.   Gastrointestinal: Negative for abdominal pain, diarrhea, nausea and vomiting.   Genitourinary: Negative for dysuria and frequency.   Musculoskeletal: Positive for arthralgias and myalgias.   Skin: Negative for rash.   Neurological: Negative for weakness and headaches.       Objective:      Physical Exam   Constitutional: She is oriented to person, place, and time. She appears well-developed. No distress.   HENT:   Head: Normocephalic and atraumatic.   Eyes: Pupils are equal, round, and reactive to light. Conjunctivae are normal.   Neck: Normal range of motion. Neck supple. No thyromegaly present.   Cardiovascular: Normal rate, regular rhythm, normal heart sounds and intact distal pulses.   Pulmonary/Chest: Effort normal and breath sounds normal. No respiratory distress. She has no wheezes.   Abdominal: Soft. Bowel sounds are normal. There is no tenderness.   Musculoskeletal: Normal range of motion. She exhibits no edema.   Lymphadenopathy:     She has no cervical adenopathy.   Neurological: She is alert and oriented to person, place, and time.   Skin:  Skin is warm and dry. No rash noted.   Psychiatric: She has a normal mood and affect. Her behavior is normal.   Nursing note and vitals reviewed.       Assessment/Plan:       Problem List Items Addressed This Visit     None      Visit Diagnoses     Pain of left heel    -  Primary    Relevant Medications    diclofenac sodium (VOLTAREN) 1 % Gel    Plantar fasciitis, left        Need for vaccination        Relevant Orders    (In Office Administered) Pneumococcal Polysaccharide Vaccine (23 Valent) (SQ/IM)      RTC if condition acutely worsens or any other concerns, otherwise RTC as scheduled

## 2019-04-24 NOTE — PATIENT INSTRUCTIONS
Treating Plantar Fasciitis  First, your healthcare provider tries to determine the cause of your problem in order to suggest ways to relieve pain. If your pain is due to poor foot mechanics, custom-made shoe inserts (orthoses) may help.    Reduce symptoms  · To relieve mild symptoms, try aspirin, ibuprofen, or other medicines as directed. Rubbing ice on the affected area may also help.  · To reduce severe pain and swelling, your healthcare provider may prescribe pills or injections or a walking cast in some instances. Physical therapy, such as ultrasound or a daily stretching program, may also be recommended. Surgery is rarely required.  · To reduce symptoms caused by poor foot mechanics, your foot may be taped. This supports the arch and temporarily controls movement. Night splints may also help by stretching the fascia.  Control movement  If taping helps, your healthcare provider may prescribe orthoses. Built from plaster casts of your feet, these inserts control the way your foot moves. As a result, your symptoms should go away.  Reduce overuse  Every time your foot strikes the ground, the plantar fascia is stretched. You can reduce the strain on the plantar fascia and the possibility of overuse by following these suggestions:  · Lose any excess weight.  · Avoid running on hard or uneven ground.  · Use orthoses at all times in your shoes and house slippers.  If surgery is needed  Your healthcare provider may consider surgery if other types of treatment don't control your pain. During surgery, the plantar fascia is partially cut to release tension. As you heal, fibrous tissue fills the space between the heel bone and the plantar fascia.   Date Last Reviewed: 10/14/2015  © 0849-3237 [employer]. [address] [place] [address], [place] [address]. All rights reserved. This information is not intended as a substitute for professional medical care. Always follow your healthcare professional's  instructions.        Plantar Fasciitis  Plantar fasciitis is a painful swelling of the plantar fascia. The plantar fascia is a thick, fibrous layer of tissue. It covers the bones on the bottom of your foot. And it supports the foot bones in an arched position.  This can happen gradually or suddenly. It usually affects one foot at a time. Heel pain can be sharp, like a knife sticking into the bottom of your foot. You may feel pain after exercising, long-distance jogging, stair climbing, long periods of standing, or after standing up.  Risk factors include: non-active lifestyle, arthritis, diabetes, obesity or recent weight gain, flat foot, high arch. Wearing high heels, loose shoes, or shoes with poor arch support for long periods of time adds to the risk. This problem is commonly found in runners and dancers. It also found in people who stand on hard surfaces for long periods of time.  Foot pain from this condition is usually worse in the morning. But it improves with walking. By the end of the day there may be a dull aching. Treatment requires short-term rest and controlling swelling. It may take up to 9 months before all symptoms go away. Rarely, a steroid injection into the foot, or surgery, may be needed.  Home care  · If you are overweight, lose weight to help healing.  · Choose supportive shoes with good arch support and shock absorbency. Replace athletic shoes when they become worn out. Dont walk or run barefoot.  · Premade or custom-fitted shoe inserts may be helpful. Inserts made of silicone seem to be the most effective. Custom-made inserts can be provided by a podiatrist or foot specialist, physical therapist, or orthopedist.  · Premade or custom-made night splints keep the heel stretched out while you sleep. They may prevent morning pain.  · Avoid activities that stress the feet: jogging, prolonged standing or walking, contact sports, etc.  · First thing in the morning and before sports, stretch the  bottom of your feet. Gently flex your ankle so the toes move toward your knee.  · Icing may help control heel pain. Apply an ice pack to the heel for 10-20 minutes as a preventive. Or ice your heel after a severe flare-up of symptoms. You may repeat this every 1-2 hours as needed.  · You may use over-the-counter pain medicine to control pain, unless another medicine was prescribed. Anti-inflammatory pain medicines, such as ibuprofen or naproxen, may work better than acetaminophen. If you have chronic liver or kidney disease or ever had a stomach ulcer or GI bleeding, talk with your healthcare provider before using these medicines.  Follow-up care  Follow up with your healthcare provider, physical therapist, or podiatrist or foot specialist as advised.  Call for an appointment if pain worsens or there is no relief after a few weeks of home treatment. Shoe inserts, a night splint, or a special boot may be required.  If X-rays were taken, you will be told of any new findings that may affect your care.  When to seek medical advice  Call your healthcare provider right away if any of these occur:  · Foot swelling  · Redness with increasing pain  Date Last Reviewed: 11/21/2015  © 4509-8953 [employer]. [address] [place] [address], [place] [address]. All rights reserved. This information is not intended as a substitute for professional medical care. Always follow your healthcare professional's instructions.        Understanding Heel Pain    Your heel is the back part of your foot. A band of tissue called the plantar fascia connects the heel bone to the bones in the ball of your foot. Nerves run from the heel up the inside of your ankle and into your leg. When you feel pain in the bottom of your heel, the plantar fascia may be inflamed. Overuse, Achilles tightness, or excess body weight can cause the tissue to tear or pull away from the bone. Sometimes the inflamed plantar fascia also irritates a nerve, causing more  pain.  What causes heel pain?  Wearing shoes with poor cushioning can irritate the tissue in your heel (plantar fascia). Being overweight or standing for long periods can also irritate the tissue. Running, walking, tennis, and other sports that put stress on the heels can cause tiny tears in the tissue. If your lower leg muscles are tight, this is more likely to occur. A tight Achilles tendon will also contribute to heel pain.  Symptoms  You may feel pain on the bottom or on the inside edge of your heel. The pain may be sharp when you get out of bed or when you stand up after sitting for a while. You may feel a dull ache in your heel after youve been standing for a long time on a hard surface. Running can also cause a dull ache.  Preventing future problems  To prevent future heel pain, wear shoes with well-cushioned heels. And do exercises prescribed by your healthcare provider to stretch the plantar fascia and the muscles in the lower leg.   Date Last Reviewed: 9/10/2015  © 6492-3515 [employer]. [address] [place] [address]. All rights reserved. This information is not intended as a substitute for professional medical care. Always follow your healthcare professional's instructions.        Heel Slides    This exercise is for an injured right knee. Switch sides if the injury is to your left knee.  1. Sit on the floor with your legs straight in front of you.  2. Slide your right heel along the floor toward you, bending your knee and keeping your foot flexed.  3. Move it as close to you as you comfortably can. Hold for 5 to 10 seconds. Then slide your heel back.  4. Repeat 5 times, or as instructed.     Tip: If youre sitting on carpet, put a plastic bag under your heel to make it slide more easily. If youre sitting on a hard floor, put a small towel under your heel.   Date Last Reviewed: 3/10/2016  © 3526-7953 [employer]. [address] [place] [address]. All  rights reserved. This information is not intended as a substitute for professional medical care. Always follow your healthcare professional's instructions.

## 2019-05-01 ENCOUNTER — TELEPHONE (OUTPATIENT)
Dept: FAMILY MEDICINE | Facility: CLINIC | Age: 69
End: 2019-05-01

## 2019-05-01 NOTE — TELEPHONE ENCOUNTER
PPSV was not in stock on 04/24/19 during pts visit. Pt will come in on Friday to receive injection

## 2019-05-01 NOTE — TELEPHONE ENCOUNTER
----- Message from Ivan Joseph sent at 2019 11:39 AM CDT -----  Contact: Patient  Raffi Amaro  MRN: [id number]  : 1950  PCP: Yahaira Bryson  Home Phone      [phone]  Work Phone      Not on file.  Mobile          [phone]      MESSAGE: needs 2nd part of flu shot -- wants to know if we received it -- please advise    Call [phone]    PCP: [place]

## 2019-05-03 ENCOUNTER — CLINICAL SUPPORT (OUTPATIENT)
Dept: FAMILY MEDICINE | Facility: CLINIC | Age: 69
End: 2019-05-03
Payer: MEDICARE

## 2019-05-03 DIAGNOSIS — Z23 IMMUNIZATION DUE: Primary | ICD-10-CM

## 2019-05-03 PROCEDURE — G0009 ADMIN PNEUMOCOCCAL VACCINE: HCPCS | Mod: S$GLB,,, | Performed by: FAMILY MEDICINE

## 2019-05-03 PROCEDURE — 90732 PNEUMOCOCCAL POLYSACCHARIDE VACCINE 23-VALENT =>2YO SQ IM: ICD-10-PCS | Mod: S$GLB,,, | Performed by: FAMILY MEDICINE

## 2019-05-03 PROCEDURE — G0009 PNEUMOCOCCAL POLYSACCHARIDE VACCINE 23-VALENT =>2YO SQ IM: ICD-10-PCS | Mod: S$GLB,,, | Performed by: FAMILY MEDICINE

## 2019-05-03 PROCEDURE — 90732 PPSV23 VACC 2 YRS+ SUBQ/IM: CPT | Mod: S$GLB,,, | Performed by: FAMILY MEDICINE

## 2019-07-19 ENCOUNTER — TELEPHONE (OUTPATIENT)
Dept: FAMILY MEDICINE | Facility: CLINIC | Age: 69
End: 2019-07-19

## 2019-07-19 NOTE — TELEPHONE ENCOUNTER
----- Message from Ivan Joseph sent at 2019  1:10 PM CDT -----  Contact: Patient  Raffi Amaro  MRN: [id number]  : 1950  PCP: Yahaira Bryson  Home Phone      [phone]  Work Phone      Not on file.  Mobile          [phone]      MESSAGE: possible sciatica -- requesting appt with Dr Bryson - next Wed if possible    Call [phone]    PCP: [place]

## 2019-07-24 ENCOUNTER — OFFICE VISIT (OUTPATIENT)
Dept: FAMILY MEDICINE | Facility: CLINIC | Age: 69
End: 2019-07-24
Payer: MEDICARE

## 2019-07-24 VITALS
HEART RATE: 78 BPM | BODY MASS INDEX: 31.49 KG/M2 | SYSTOLIC BLOOD PRESSURE: 136 MMHG | HEIGHT: 65 IN | DIASTOLIC BLOOD PRESSURE: 98 MMHG | WEIGHT: 189 LBS | RESPIRATION RATE: 18 BRPM

## 2019-07-24 DIAGNOSIS — M62.830 SPASM OF LUMBAR PARASPINOUS MUSCLE: ICD-10-CM

## 2019-07-24 DIAGNOSIS — M54.17 RADICULOPATHY, LUMBOSACRAL REGION: Primary | ICD-10-CM

## 2019-07-24 PROCEDURE — 3075F SYST BP GE 130 - 139MM HG: CPT | Mod: CPTII,S$GLB,, | Performed by: FAMILY MEDICINE

## 2019-07-24 PROCEDURE — 99999 PR PBB SHADOW E&M-EST. PATIENT-LVL III: ICD-10-PCS | Mod: PBBFAC,,, | Performed by: FAMILY MEDICINE

## 2019-07-24 PROCEDURE — 99999 PR PBB SHADOW E&M-EST. PATIENT-LVL III: CPT | Mod: PBBFAC,,, | Performed by: FAMILY MEDICINE

## 2019-07-24 PROCEDURE — 1101F PT FALLS ASSESS-DOCD LE1/YR: CPT | Mod: CPTII,S$GLB,, | Performed by: FAMILY MEDICINE

## 2019-07-24 PROCEDURE — 3080F DIAST BP >= 90 MM HG: CPT | Mod: CPTII,S$GLB,, | Performed by: FAMILY MEDICINE

## 2019-07-24 PROCEDURE — 99213 PR OFFICE/OUTPT VISIT, EST, LEVL III, 20-29 MIN: ICD-10-PCS | Mod: 25,S$GLB,, | Performed by: FAMILY MEDICINE

## 2019-07-24 PROCEDURE — 3080F PR MOST RECENT DIASTOLIC BLOOD PRESSURE >= 90 MM HG: ICD-10-PCS | Mod: CPTII,S$GLB,, | Performed by: FAMILY MEDICINE

## 2019-07-24 PROCEDURE — 96372 PR INJECTION,THERAP/PROPH/DIAG2ST, IM OR SUBCUT: ICD-10-PCS | Mod: S$GLB,,, | Performed by: FAMILY MEDICINE

## 2019-07-24 PROCEDURE — 3075F PR MOST RECENT SYSTOLIC BLOOD PRESS GE 130-139MM HG: ICD-10-PCS | Mod: CPTII,S$GLB,, | Performed by: FAMILY MEDICINE

## 2019-07-24 PROCEDURE — 96372 THER/PROPH/DIAG INJ SC/IM: CPT | Mod: S$GLB,,, | Performed by: FAMILY MEDICINE

## 2019-07-24 PROCEDURE — 1101F PR PT FALLS ASSESS DOC 0-1 FALLS W/OUT INJ PAST YR: ICD-10-PCS | Mod: CPTII,S$GLB,, | Performed by: FAMILY MEDICINE

## 2019-07-24 PROCEDURE — 99213 OFFICE O/P EST LOW 20 MIN: CPT | Mod: 25,S$GLB,, | Performed by: FAMILY MEDICINE

## 2019-07-24 RX ORDER — CYCLOBENZAPRINE HCL 5 MG
5 TABLET ORAL NIGHTLY
Qty: 10 TABLET | Refills: 0 | Status: SHIPPED | OUTPATIENT
Start: 2019-07-24 | End: 2019-08-03

## 2019-07-24 RX ORDER — METHYLPREDNISOLONE ACETATE 40 MG/ML
40 INJECTION, SUSPENSION INTRA-ARTICULAR; INTRALESIONAL; INTRAMUSCULAR; SOFT TISSUE
Status: COMPLETED | OUTPATIENT
Start: 2019-07-24 | End: 2019-07-24

## 2019-07-24 RX ORDER — KETOROLAC TROMETHAMINE 30 MG/ML
30 INJECTION, SOLUTION INTRAMUSCULAR; INTRAVENOUS
Status: COMPLETED | OUTPATIENT
Start: 2019-07-24 | End: 2019-07-24

## 2019-07-24 RX ORDER — METHYLPREDNISOLONE 4 MG/1
TABLET ORAL
Qty: 1 PACKAGE | Refills: 0 | Status: SHIPPED | OUTPATIENT
Start: 2019-07-24 | End: 2022-05-20 | Stop reason: SDUPTHER

## 2019-07-24 RX ORDER — ALENDRONATE SODIUM 70 MG/1
70 TABLET ORAL
Qty: 4 TABLET | Refills: 11 | Status: SHIPPED | OUTPATIENT
Start: 2019-07-24 | End: 2020-06-02

## 2019-07-24 RX ORDER — METHOCARBAMOL 500 MG/1
500 TABLET, FILM COATED ORAL 3 TIMES DAILY PRN
Qty: 40 TABLET | Refills: 0 | Status: SHIPPED | OUTPATIENT
Start: 2019-07-24 | End: 2019-08-08 | Stop reason: SDUPTHER

## 2019-07-24 RX ADMIN — KETOROLAC TROMETHAMINE 30 MG: 30 INJECTION, SOLUTION INTRAMUSCULAR; INTRAVENOUS at 11:07

## 2019-07-24 RX ADMIN — METHYLPREDNISOLONE ACETATE 40 MG: 40 INJECTION, SUSPENSION INTRA-ARTICULAR; INTRALESIONAL; INTRAMUSCULAR; SOFT TISSUE at 11:07

## 2019-07-24 NOTE — PROGRESS NOTES
Subjective:       Patient ID: Raffi Amaro is a 69 y.o. female.    Chief Complaint: Pain (Sciatic pain x 2 weeks)    HPI  69 year old female comes in with c/o lower back pain and radiation into both legs. Her left leg is feeling numb at times. She has had no incontinence but has had more 'pressure on her bladder.'  She has taken tylenol for her pain and has noticed that warmth is helping.    PMH, PSH, ALLERGIES, SH, FH reviewed in nurse's notes above  Medications reconciled in the nurse's notes    Review of Systems   Constitutional: Negative for chills and fever.   HENT: Negative for congestion, ear pain, postnasal drip, rhinorrhea, sore throat and trouble swallowing.    Eyes: Negative for redness and itching.   Respiratory: Negative for cough, shortness of breath and wheezing.    Cardiovascular: Negative for chest pain and palpitations.   Gastrointestinal: Negative for abdominal pain, diarrhea, nausea and vomiting.   Genitourinary: Negative for dysuria and frequency.   Musculoskeletal: Positive for arthralgias, back pain and gait problem.   Skin: Negative for rash.   Neurological: Negative for weakness and headaches.       Objective:      Physical Exam   Constitutional: She is oriented to person, place, and time. She appears well-developed. No distress.   HENT:   Head: Normocephalic and atraumatic.   Eyes: Pupils are equal, round, and reactive to light. Conjunctivae are normal.   Neck: Normal range of motion. Neck supple. No thyromegaly present.   Cardiovascular: Normal rate, regular rhythm, normal heart sounds and intact distal pulses.   Pulmonary/Chest: Effort normal and breath sounds normal. No respiratory distress. She has no wheezes.   Abdominal: Soft. There is no tenderness.   Musculoskeletal: Normal range of motion. She exhibits no edema.   + right leg raise.    Paraspinal tnedneress over L4-l5   Lymphadenopathy:     She has no cervical adenopathy.   Neurological: She is alert and oriented to person,  place, and time.   Skin: Skin is warm and dry. No rash noted.   Psychiatric: She has a normal mood and affect. Her behavior is normal.   Nursing note and vitals reviewed.       Assessment/Plan:       Problem List Items Addressed This Visit     None      Visit Diagnoses     Radiculopathy, lumbosacral region    -  Primary    Relevant Medications    ketorolac injection 30 mg (Completed)    methylPREDNISolone acetate injection 40 mg (Completed)    Spasm of lumbar paraspinous muscle        Relevant Medications    methylPREDNISolone (MEDROL DOSEPACK) 4 mg tablet    methocarbamol (ROBAXIN) 500 MG Tab    cyclobenzaprine (FLEXERIL) 5 MG tablet        RTC if condition acutely worsens or any other concerns, otherwise RTC as scheduled

## 2019-07-24 NOTE — TELEPHONE ENCOUNTER
- very nauseated, may be due to injection - Feeling better now Stop and got a slushie at [facility]  Needs refills

## 2019-07-24 NOTE — LETTER
July 24, 2019      [address] [address]  Phone: [phone]  Fax: [phone]       Patient: Raffi Amaro   YOB: 1950  Date of Visit: 07/24/2019    To Whom It May Concern:    Grant Amaro  was at [facility] on 07/24/2019. She may not return to work until she is seen by me on 8/8/19. If you have any questions or concerns, or if I can be of further assistance, please do not hesitate to contact me.    Sincerely,    Yahaira Bryson MD

## 2019-07-24 NOTE — TELEPHONE ENCOUNTER
----- Message from Ivan Joseph sent at 2019 12:14 PM CDT -----  Contact: Patient  Raffi Amaro  MRN: [id number]  : 1950  PCP: Yahaira Bryson  Home Phone      [phone]  Work Phone      Not on file.  Mobile          [phone]      MESSAGE: seen today by Dr Bryson -- very nauseated, may be due to injection -- please return call ASAP    Call [phone]    PCP: [place]

## 2019-08-08 ENCOUNTER — OFFICE VISIT (OUTPATIENT)
Dept: FAMILY MEDICINE | Facility: CLINIC | Age: 69
End: 2019-08-08
Payer: MEDICARE

## 2019-08-08 VITALS
DIASTOLIC BLOOD PRESSURE: 76 MMHG | RESPIRATION RATE: 16 BRPM | HEART RATE: 72 BPM | HEIGHT: 65 IN | SYSTOLIC BLOOD PRESSURE: 122 MMHG | WEIGHT: 187 LBS | BODY MASS INDEX: 31.16 KG/M2

## 2019-08-08 DIAGNOSIS — M62.830 SPASM OF LUMBAR PARASPINOUS MUSCLE: ICD-10-CM

## 2019-08-08 PROCEDURE — 3074F PR MOST RECENT SYSTOLIC BLOOD PRESSURE < 130 MM HG: ICD-10-PCS | Mod: CPTII,S$GLB,, | Performed by: FAMILY MEDICINE

## 2019-08-08 PROCEDURE — 1101F PR PT FALLS ASSESS DOC 0-1 FALLS W/OUT INJ PAST YR: ICD-10-PCS | Mod: CPTII,S$GLB,, | Performed by: FAMILY MEDICINE

## 2019-08-08 PROCEDURE — 3078F DIAST BP <80 MM HG: CPT | Mod: CPTII,S$GLB,, | Performed by: FAMILY MEDICINE

## 2019-08-08 PROCEDURE — 99999 PR PBB SHADOW E&M-EST. PATIENT-LVL III: CPT | Mod: PBBFAC,,, | Performed by: FAMILY MEDICINE

## 2019-08-08 PROCEDURE — 99999 PR PBB SHADOW E&M-EST. PATIENT-LVL III: ICD-10-PCS | Mod: PBBFAC,,, | Performed by: FAMILY MEDICINE

## 2019-08-08 PROCEDURE — 3078F PR MOST RECENT DIASTOLIC BLOOD PRESSURE < 80 MM HG: ICD-10-PCS | Mod: CPTII,S$GLB,, | Performed by: FAMILY MEDICINE

## 2019-08-08 PROCEDURE — 1101F PT FALLS ASSESS-DOCD LE1/YR: CPT | Mod: CPTII,S$GLB,, | Performed by: FAMILY MEDICINE

## 2019-08-08 PROCEDURE — 3074F SYST BP LT 130 MM HG: CPT | Mod: CPTII,S$GLB,, | Performed by: FAMILY MEDICINE

## 2019-08-08 PROCEDURE — 99213 OFFICE O/P EST LOW 20 MIN: CPT | Mod: S$GLB,,, | Performed by: FAMILY MEDICINE

## 2019-08-08 PROCEDURE — 99213 PR OFFICE/OUTPT VISIT, EST, LEVL III, 20-29 MIN: ICD-10-PCS | Mod: S$GLB,,, | Performed by: FAMILY MEDICINE

## 2019-08-08 RX ORDER — METHOCARBAMOL 500 MG/1
500 TABLET, FILM COATED ORAL 3 TIMES DAILY PRN
Qty: 40 TABLET | Refills: 0 | Status: SHIPPED | OUTPATIENT
Start: 2019-08-08 | End: 2019-09-26 | Stop reason: SDUPTHER

## 2019-08-08 NOTE — PROGRESS NOTES
Subjective:       Patient ID: Raffi Amaro is a 69 y.o. female.    Chief Complaint: Follow-up (Follow up; back pain)    HPI  69 year old female comes in for f/u of back pain. She says that her pain is very localized. She can pinpoint this pain exactly. She has been using a back brace. Her pain is better, but this has persisted. No radiation of her pain. No weakness.    PMH, PSH, ALLERGIES, SH, FH reviewed in nurse's notes above  Medications reconciled in the nurse's notes    Review of Systems   Constitutional: Negative for chills and fever.   HENT: Negative for congestion, ear pain, postnasal drip, rhinorrhea, sore throat and trouble swallowing.    Eyes: Negative for redness and itching.   Respiratory: Negative for cough, shortness of breath and wheezing.    Cardiovascular: Negative for chest pain and palpitations.   Gastrointestinal: Negative for abdominal pain, diarrhea, nausea and vomiting.   Genitourinary: Negative for dysuria and frequency.   Musculoskeletal: Positive for back pain and myalgias.   Skin: Negative for rash.   Neurological: Negative for weakness and headaches.       Objective:      Physical Exam   Constitutional: She is oriented to person, place, and time. She appears well-developed. No distress.   HENT:   Head: Normocephalic and atraumatic.   Eyes: Pupils are equal, round, and reactive to light. Conjunctivae are normal.   Neck: Normal range of motion. Neck supple. No thyromegaly present.   Cardiovascular: Normal rate, regular rhythm, normal heart sounds and intact distal pulses.   Pulmonary/Chest: Effort normal and breath sounds normal. No respiratory distress. She has no wheezes.   Abdominal: Soft. Bowel sounds are normal. There is no tenderness.   Musculoskeletal: Normal range of motion. She exhibits no edema.   Tenderness to left paraspinal muscle - with spasm in lumbar area.   Lymphadenopathy:     She has no cervical adenopathy.   Neurological: She is alert and oriented to person, place,  and time.   Skin: Skin is warm and dry. No rash noted.   Psychiatric: She has a normal mood and affect. Her behavior is normal.   Nursing note and vitals reviewed.       Assessment/Plan:       Problem List Items Addressed This Visit     None      Visit Diagnoses     Spasm of lumbar paraspinous muscle        Relevant Medications    methocarbamol (ROBAXIN) 500 MG Tab      okay to cont cbd oil.    Will come back for xr in 2 weeks if no improvement.    RTC if condition acutely worsens or any other concerns, otherwise RTC as scheduled

## 2019-09-25 ENCOUNTER — TELEPHONE (OUTPATIENT)
Dept: FAMILY MEDICINE | Facility: CLINIC | Age: 69
End: 2019-09-25

## 2019-09-25 DIAGNOSIS — M54.50 LUMBAR PAIN: Primary | ICD-10-CM

## 2019-09-25 DIAGNOSIS — M62.830 SPASM OF LUMBAR PARASPINOUS MUSCLE: ICD-10-CM

## 2019-09-25 NOTE — TELEPHONE ENCOUNTER
LOV 08/08/2019    Contacted pt states she got up this morning with lower L sided back pain. She believes it is her sciatic nerve. States she finished muscle relaxer. Please advise, thank you.    Pharmacy: [facility]

## 2019-09-25 NOTE — TELEPHONE ENCOUNTER
----- Message from Ivan Joseph sent at 2019 12:10 PM CDT -----  Contact: patient  Raffi Amaro  MRN: [id number]  : 1950  PCP: Yahaira Bryson  Home Phone      [phone]  Work Phone      Not on file.  [employer]          [phone]      MESSAGE: back still not 100% -- requesting to speak with nurse    Call [phone]    PCP: [place]

## 2019-09-26 RX ORDER — METHOCARBAMOL 500 MG/1
500 TABLET, FILM COATED ORAL 3 TIMES DAILY PRN
Qty: 40 TABLET | Refills: 0 | Status: SHIPPED | OUTPATIENT
Start: 2019-09-26 | End: 2022-05-20 | Stop reason: SDUPTHER

## 2019-09-26 NOTE — TELEPHONE ENCOUNTER
Physical therapy ordered. Robaxin refilled.  We had discussed xr if no better by mid august but this seems more like improvement then flair.  mh

## 2019-10-03 NOTE — TELEPHONE ENCOUNTER
Spoke w/ patient, she states the medication is helping a lot right now, she thinks she just exacerbated her sciatica because she leaned over a deep freezer at waist height. She is still doing her at-home therapy exercises she was shown. She wants to wait on full fledged therapy for now.

## 2019-11-05 ENCOUNTER — TELEPHONE (OUTPATIENT)
Dept: FAMILY MEDICINE | Facility: CLINIC | Age: 69
End: 2019-11-05

## 2019-11-05 NOTE — TELEPHONE ENCOUNTER
Patient called in requesting a refill on alendronate - Notified her that she has remaining refills and advised her to contact her pharmacy.    Pt verbally understood.

## 2020-03-13 DIAGNOSIS — Z12.11 COLON CANCER SCREENING: ICD-10-CM

## 2020-06-02 RX ORDER — ALENDRONATE SODIUM 70 MG/1
TABLET ORAL
Qty: 12 TABLET | Refills: 3 | Status: SHIPPED | OUTPATIENT
Start: 2020-06-02 | End: 2021-03-15 | Stop reason: SDUPTHER

## 2020-09-21 ENCOUNTER — OFFICE VISIT (OUTPATIENT)
Dept: FAMILY MEDICINE | Facility: CLINIC | Age: 70
End: 2020-09-21
Payer: MEDICARE

## 2020-09-21 VITALS
HEART RATE: 78 BPM | OXYGEN SATURATION: 99 % | SYSTOLIC BLOOD PRESSURE: 122 MMHG | BODY MASS INDEX: 31.44 KG/M2 | WEIGHT: 188.69 LBS | DIASTOLIC BLOOD PRESSURE: 86 MMHG | HEIGHT: 65 IN

## 2020-09-21 DIAGNOSIS — R21 RASH: Primary | ICD-10-CM

## 2020-09-21 PROCEDURE — 99999 PR PBB SHADOW E&M-EST. PATIENT-LVL III: CPT | Mod: PBBFAC,HCNC,, | Performed by: FAMILY MEDICINE

## 2020-09-21 PROCEDURE — 1159F MED LIST DOCD IN RCRD: CPT | Mod: HCNC,S$GLB,, | Performed by: FAMILY MEDICINE

## 2020-09-21 PROCEDURE — 3079F PR MOST RECENT DIASTOLIC BLOOD PRESSURE 80-89 MM HG: ICD-10-PCS | Mod: HCNC,CPTII,S$GLB, | Performed by: FAMILY MEDICINE

## 2020-09-21 PROCEDURE — 3008F BODY MASS INDEX DOCD: CPT | Mod: HCNC,CPTII,S$GLB, | Performed by: FAMILY MEDICINE

## 2020-09-21 PROCEDURE — 90694 FLU VACCINE - QUADRIVALENT - ADJUVANTED: ICD-10-PCS | Mod: HCNC,S$GLB,, | Performed by: FAMILY MEDICINE

## 2020-09-21 PROCEDURE — 3008F PR BODY MASS INDEX (BMI) DOCUMENTED: ICD-10-PCS | Mod: HCNC,CPTII,S$GLB, | Performed by: FAMILY MEDICINE

## 2020-09-21 PROCEDURE — G0008 ADMIN INFLUENZA VIRUS VAC: HCPCS | Mod: HCNC,S$GLB,, | Performed by: FAMILY MEDICINE

## 2020-09-21 PROCEDURE — 99213 OFFICE O/P EST LOW 20 MIN: CPT | Mod: 25,HCNC,S$GLB, | Performed by: FAMILY MEDICINE

## 2020-09-21 PROCEDURE — 3079F DIAST BP 80-89 MM HG: CPT | Mod: HCNC,CPTII,S$GLB, | Performed by: FAMILY MEDICINE

## 2020-09-21 PROCEDURE — 3074F PR MOST RECENT SYSTOLIC BLOOD PRESSURE < 130 MM HG: ICD-10-PCS | Mod: HCNC,CPTII,S$GLB, | Performed by: FAMILY MEDICINE

## 2020-09-21 PROCEDURE — G0008 PR ADMIN INFLUENZA VIRUS VAC: ICD-10-PCS | Mod: HCNC,S$GLB,, | Performed by: FAMILY MEDICINE

## 2020-09-21 PROCEDURE — 99213 PR OFFICE/OUTPT VISIT, EST, LEVL III, 20-29 MIN: ICD-10-PCS | Mod: 25,HCNC,S$GLB, | Performed by: FAMILY MEDICINE

## 2020-09-21 PROCEDURE — 3074F SYST BP LT 130 MM HG: CPT | Mod: HCNC,CPTII,S$GLB, | Performed by: FAMILY MEDICINE

## 2020-09-21 PROCEDURE — 90694 VACC AIIV4 NO PRSRV 0.5ML IM: CPT | Mod: HCNC,S$GLB,, | Performed by: FAMILY MEDICINE

## 2020-09-21 PROCEDURE — 1159F PR MEDICATION LIST DOCUMENTED IN MEDICAL RECORD: ICD-10-PCS | Mod: HCNC,S$GLB,, | Performed by: FAMILY MEDICINE

## 2020-09-21 PROCEDURE — 99999 PR PBB SHADOW E&M-EST. PATIENT-LVL III: ICD-10-PCS | Mod: PBBFAC,HCNC,, | Performed by: FAMILY MEDICINE

## 2020-09-21 RX ORDER — TRIAMCINOLONE ACETONIDE 1 MG/G
OINTMENT TOPICAL 2 TIMES DAILY
Qty: 1 TUBE | Refills: 1 | Status: SHIPPED | OUTPATIENT
Start: 2020-09-21 | End: 2023-09-27

## 2020-09-21 NOTE — PROGRESS NOTES
Subjective:       Patient ID: Raffi Amaro is a 70 y.o. female.    Chief Complaint: Rash    Pt is a 70 y.o. female who presents for evaluation and management of   Encounter Diagnosis   Name Primary?    Rash Yes   .red itchy annular lesions  Bed bugs----she wahses her linen and sprays her bed with lysol religiously   She spends a lot of time in the yard   Doing well on current meds. Denies any side effects. Prevention is up to date.     Review of Systems   Constitutional: Negative for fever.   Skin: Positive for rash.       Objective:      Physical Exam  Constitutional:       Appearance: She is well-developed.   HENT:      Head: Normocephalic and atraumatic.      Right Ear: External ear normal.      Left Ear: External ear normal.      Nose: Nose normal.   Eyes:      Pupils: Pupils are equal, round, and reactive to light.   Neck:      Musculoskeletal: Normal range of motion and neck supple.      Thyroid: No thyromegaly.      Vascular: No JVD.      Trachea: No tracheal deviation.   Cardiovascular:      Rate and Rhythm: Normal rate.      Heart sounds: Normal heart sounds. No murmur.   Pulmonary:      Effort: Pulmonary effort is normal. No respiratory distress.      Breath sounds: Normal breath sounds. No wheezing or rales.   Chest:      Chest wall: No tenderness.   Abdominal:      General: Bowel sounds are normal. There is no distension.      Palpations: Abdomen is soft. There is no mass.      Tenderness: There is no abdominal tenderness. There is no guarding or rebound.   Musculoskeletal: Normal range of motion.         General: No tenderness.   Lymphadenopathy:      Cervical: No cervical adenopathy.   Skin:     General: Skin is warm and dry.      Coloration: Skin is not pale.      Findings: No erythema or rash.      Comments: Upper limbs and torso with several annular macules a little larger then a centimeter.  Looks like bed bugs??    Neurological:      Mental Status: She is alert and oriented to person, place,  and time.      Cranial Nerves: No cranial nerve deficit.      Motor: No abnormal muscle tone.      Coordination: Coordination normal.      Deep Tendon Reflexes: Reflexes are normal and symmetric. Reflexes normal.   Psychiatric:         Behavior: Behavior normal.         Thought Content: Thought content normal.         Judgment: Judgment normal.          Assessment:       1. Rash        Plan:   Raffi was seen today for rash.    Diagnoses and all orders for this visit:    Rash  -     triamcinolone acetonide 0.1% (KENALOG) 0.1 % ointment; Apply topically 2 (two) times daily. for 10 days      Problem List Items Addressed This Visit     None      Visit Diagnoses     Rash    -  Primary    Relevant Medications    triamcinolone acetonide 0.1% (KENALOG) 0.1 % ointment        Bed bugs??? Looks like that, but her history doesn't support it. Maybe just insect bites from the yard---she is outside all day long       No follow-ups on file.

## 2020-10-02 ENCOUNTER — PATIENT OUTREACH (OUTPATIENT)
Dept: ADMINISTRATIVE | Facility: HOSPITAL | Age: 70
End: 2020-10-02

## 2020-11-12 ENCOUNTER — TELEPHONE (OUTPATIENT)
Dept: FAMILY MEDICINE | Facility: CLINIC | Age: 70
End: 2020-11-12

## 2020-11-12 DIAGNOSIS — Z12.39 ENCOUNTER FOR SCREENING FOR MALIGNANT NEOPLASM OF BREAST, UNSPECIFIED SCREENING MODALITY: Primary | ICD-10-CM

## 2020-11-12 DIAGNOSIS — Z12.31 ENCOUNTER FOR SCREENING MAMMOGRAM FOR MALIGNANT NEOPLASM OF BREAST: ICD-10-CM

## 2020-11-12 DIAGNOSIS — M81.0 OSTEOPOROSIS, UNSPECIFIED OSTEOPOROSIS TYPE, UNSPECIFIED PATHOLOGICAL FRACTURE PRESENCE: ICD-10-CM

## 2020-11-12 NOTE — TELEPHONE ENCOUNTER
Orders in Pt MMG and Dexa at [facility]  Orders faxed to [phone]   Apt made for 1/19/21 at 1:45 pm

## 2020-11-12 NOTE — TELEPHONE ENCOUNTER
"----- Message from Bridgett Boone MA sent at 11/12/2020  8:54 AM CST -----  Raffi Amaro  MRN: [id number]  Home Phone      [phone]  Work Phone      Not on file.  Mobile          [phone]    Patient Care Team:  Yahaira Bryson MD as PCP - General (Family Medicine)  Nicole Bhatia LPN as Care Coordinator  OB? "No"  What phone number can you be reached at? [phone]  Message:    Pt needs Dexa and Mammo order to be put in, pt will have test done at [facility].     Thanks,   Bridgett Boone MA     "

## 2020-11-17 ENCOUNTER — TELEPHONE (OUTPATIENT)
Dept: FAMILY MEDICINE | Facility: CLINIC | Age: 70
End: 2020-11-17

## 2020-11-17 NOTE — TELEPHONE ENCOUNTER
Patient reports Acosta has scheduled this for her at [facility]. Telephone encounter from 11/12 shows this is scheduled for January 19th at 1:45pm. Patient notified.

## 2020-11-17 NOTE — TELEPHONE ENCOUNTER
----- Message from Darling Santos sent at 2020  3:24 PM CST -----  Raffi Amaro  MRN: [id number]  : 1950  PCP: Yahaira Bryson  Home Phone      [phone]  Work Phone      Not on file.  [employer]          [phone]      MESSAGE:   Patient would like to schedule bone density test and Mammo.     [phone]

## 2021-02-04 ENCOUNTER — PATIENT OUTREACH (OUTPATIENT)
Dept: ADMINISTRATIVE | Facility: HOSPITAL | Age: 71
End: 2021-02-04

## 2021-02-17 ENCOUNTER — TELEPHONE (OUTPATIENT)
Dept: ADMINISTRATIVE | Facility: HOSPITAL | Age: 71
End: 2021-02-17

## 2021-02-17 DIAGNOSIS — Z12.11 SCREENING FOR COLON CANCER: Primary | ICD-10-CM

## 2021-03-16 RX ORDER — ALENDRONATE SODIUM 70 MG/1
TABLET ORAL
Qty: 12 TABLET | Refills: 3 | Status: SHIPPED | OUTPATIENT
Start: 2021-03-16 | End: 2021-12-28

## 2021-04-14 ENCOUNTER — TELEPHONE (OUTPATIENT)
Dept: FAMILY MEDICINE | Facility: CLINIC | Age: 71
End: 2021-04-14

## 2022-05-18 ENCOUNTER — TELEPHONE (OUTPATIENT)
Dept: FAMILY MEDICINE | Facility: CLINIC | Age: 72
End: 2022-05-18
Payer: MEDICARE

## 2022-05-18 DIAGNOSIS — M62.830 SPASM OF LUMBAR PARASPINOUS MUSCLE: ICD-10-CM

## 2022-05-18 NOTE — TELEPHONE ENCOUNTER
----- Message from Ivan Joseph sent at 2022  9:26 AM CDT -----  Contact: Patient  Raffi Amaro  MRN: [id number]  : 1950  PCP: Yahaira Bryson  Home Phone      [phone]  Work Phone      Not on file.  Mobile          [phone]      MESSAGE: sciatica - hard to walk - unable to drive - no way to come in or get medication - is there something that can be done -- please advise    Call [phone]    PCP: [place]

## 2022-05-20 RX ORDER — METHOCARBAMOL 500 MG/1
500 TABLET, FILM COATED ORAL 3 TIMES DAILY PRN
Qty: 40 TABLET | Refills: 0 | Status: SHIPPED | OUTPATIENT
Start: 2022-05-20 | End: 2022-06-08 | Stop reason: SDUPTHER

## 2022-05-20 RX ORDER — METHYLPREDNISOLONE 4 MG/1
TABLET ORAL
Qty: 1 EACH | Refills: 0 | Status: SHIPPED | OUTPATIENT
Start: 2022-05-20 | End: 2022-05-25

## 2022-05-20 NOTE — TELEPHONE ENCOUNTER
Needs to be seen - urgent care can take care of this or we can - but she needs to be seen. She can try ibuprofen or aleve, but if it isn't helping, an eval is necessary.   mh         Called pt no answer unable to leave vm. No  set up

## 2022-05-20 NOTE — TELEPHONE ENCOUNTER
Spoke with patient, and she states that she is having scatiac pain, she states it covers her lower back and she would rate it a 20/10 on pain scale. Patient states that she steve not go to the urgent care because she doesn't believe in it and she cannot drive on the road because of the pain and  and brother recently , and children do not live around to bring her. Patient does not know how to work virtual visit so she refused that appointment.     Patient states that she will call us when she is feeling better and make an appointment. Patient lives far away to drive for so long with this pain.

## 2022-05-23 ENCOUNTER — TELEPHONE (OUTPATIENT)
Dept: FAMILY MEDICINE | Facility: CLINIC | Age: 72
End: 2022-05-23
Payer: MEDICARE

## 2022-05-23 NOTE — TELEPHONE ENCOUNTER
Informed patient about medication. She states she has been using OTC pain patches to help with pain and last night was first night she slept through the night with waking up 4x. Patient also states that bladder gets weak from pain and will urinate on self. Patient stated she is able to come to the clinic Wednesday. Patient scheduled appointment for 5/25 with Dr. Bryson and date/time confirmed.

## 2022-05-23 NOTE — TELEPHONE ENCOUNTER
----- Message from Ivan Joseph sent at 2022  1:00 PM CDT -----  Contact: Patient  Raffi Amaro  MRN: [id number]  : 1950  PCP: Yahaira Bryson  Home Phone      [phone]  Work Phone      Not on file.  Mobile          [phone]      MESSAGE: spoke with nurse last week Re: sciatica -- told to call back today& speak with nurse    Call [phone]    PCP: [place]

## 2022-05-25 ENCOUNTER — OFFICE VISIT (OUTPATIENT)
Dept: FAMILY MEDICINE | Facility: CLINIC | Age: 72
End: 2022-05-25
Payer: MEDICARE

## 2022-05-25 ENCOUNTER — LAB VISIT (OUTPATIENT)
Dept: LAB | Facility: HOSPITAL | Age: 72
End: 2022-05-25
Attending: FAMILY MEDICINE
Payer: MEDICARE

## 2022-05-25 VITALS
DIASTOLIC BLOOD PRESSURE: 90 MMHG | HEIGHT: 65 IN | WEIGHT: 192.25 LBS | SYSTOLIC BLOOD PRESSURE: 123 MMHG | RESPIRATION RATE: 16 BRPM | BODY MASS INDEX: 32.03 KG/M2 | HEART RATE: 68 BPM

## 2022-05-25 DIAGNOSIS — M81.0 OSTEOPOROSIS, UNSPECIFIED OSTEOPOROSIS TYPE, UNSPECIFIED PATHOLOGICAL FRACTURE PRESENCE: ICD-10-CM

## 2022-05-25 DIAGNOSIS — Z01.419 WELL WOMAN EXAM: ICD-10-CM

## 2022-05-25 DIAGNOSIS — Z12.31 VISIT FOR SCREENING MAMMOGRAM: ICD-10-CM

## 2022-05-25 DIAGNOSIS — R30.0 DYSURIA: ICD-10-CM

## 2022-05-25 DIAGNOSIS — Z01.419 WELL WOMAN EXAM: Primary | ICD-10-CM

## 2022-05-25 LAB
25(OH)D3+25(OH)D2 SERPL-MCNC: 51 NG/ML (ref 30–96)
ALBUMIN SERPL BCP-MCNC: 4 G/DL (ref 3.5–5.2)
ALP SERPL-CCNC: 68 U/L (ref 55–135)
ALT SERPL W/O P-5'-P-CCNC: 25 U/L (ref 10–44)
ANION GAP SERPL CALC-SCNC: 10 MMOL/L (ref 8–16)
AST SERPL-CCNC: 23 U/L (ref 10–40)
BASOPHILS # BLD AUTO: 0.05 K/UL (ref 0–0.2)
BASOPHILS NFR BLD: 0.8 % (ref 0–1.9)
BILIRUB SERPL-MCNC: 0.9 MG/DL (ref 0.1–1)
BILIRUB UR QL STRIP: NEGATIVE
BUN SERPL-MCNC: 18 MG/DL (ref 8–23)
CALCIUM SERPL-MCNC: 9.6 MG/DL (ref 8.7–10.5)
CHLORIDE SERPL-SCNC: 105 MMOL/L (ref 95–110)
CHOLEST SERPL-MCNC: 176 MG/DL (ref 120–199)
CHOLEST/HDLC SERPL: 3.5 {RATIO} (ref 2–5)
CLARITY UR: CLEAR
CO2 SERPL-SCNC: 28 MMOL/L (ref 23–29)
COLOR UR: YELLOW
CREAT SERPL-MCNC: 0.9 MG/DL (ref 0.5–1.4)
DIFFERENTIAL METHOD: ABNORMAL
EOSINOPHIL # BLD AUTO: 0.2 K/UL (ref 0–0.5)
EOSINOPHIL NFR BLD: 3.1 % (ref 0–8)
ERYTHROCYTE [DISTWIDTH] IN BLOOD BY AUTOMATED COUNT: 13 % (ref 11.5–14.5)
EST. GFR  (AFRICAN AMERICAN): >60 ML/MIN/1.73 M^2
EST. GFR  (NON AFRICAN AMERICAN): >60 ML/MIN/1.73 M^2
GLUCOSE SERPL-MCNC: 93 MG/DL (ref 70–110)
GLUCOSE UR QL STRIP: NEGATIVE
HCT VFR BLD AUTO: 44.2 % (ref 37–48.5)
HDLC SERPL-MCNC: 51 MG/DL (ref 40–75)
HDLC SERPL: 29 % (ref 20–50)
HGB BLD-MCNC: 14 G/DL (ref 12–16)
HGB UR QL STRIP: NEGATIVE
IMM GRANULOCYTES # BLD AUTO: 0.01 K/UL (ref 0–0.04)
IMM GRANULOCYTES NFR BLD AUTO: 0.2 % (ref 0–0.5)
KETONES UR QL STRIP: NEGATIVE
LDLC SERPL CALC-MCNC: 104.4 MG/DL (ref 63–159)
LEUKOCYTE ESTERASE UR QL STRIP: NEGATIVE
LYMPHOCYTES # BLD AUTO: 2 K/UL (ref 1–4.8)
LYMPHOCYTES NFR BLD: 30.2 % (ref 18–48)
MCH RBC QN AUTO: 28.4 PG (ref 27–31)
MCHC RBC AUTO-ENTMCNC: 31.7 G/DL (ref 32–36)
MCV RBC AUTO: 90 FL (ref 82–98)
MONOCYTES # BLD AUTO: 0.4 K/UL (ref 0.3–1)
MONOCYTES NFR BLD: 6.6 % (ref 4–15)
NEUTROPHILS # BLD AUTO: 3.9 K/UL (ref 1.8–7.7)
NEUTROPHILS NFR BLD: 59.1 % (ref 38–73)
NITRITE UR QL STRIP: NEGATIVE
NONHDLC SERPL-MCNC: 125 MG/DL
NRBC BLD-RTO: 0 /100 WBC
PH UR STRIP: 6 [PH] (ref 5–8)
PLATELET # BLD AUTO: 233 K/UL (ref 150–450)
PMV BLD AUTO: 10.2 FL (ref 9.2–12.9)
POTASSIUM SERPL-SCNC: 3.9 MMOL/L (ref 3.5–5.1)
PROT SERPL-MCNC: 7.7 G/DL (ref 6–8.4)
PROT UR QL STRIP: NEGATIVE
RBC # BLD AUTO: 4.93 M/UL (ref 4–5.4)
SODIUM SERPL-SCNC: 143 MMOL/L (ref 136–145)
SP GR UR STRIP: 1.02 (ref 1–1.03)
TRIGL SERPL-MCNC: 103 MG/DL (ref 30–150)
URN SPEC COLLECT METH UR: NORMAL
UROBILINOGEN UR STRIP-ACNC: NEGATIVE EU/DL
WBC # BLD AUTO: 6.5 K/UL (ref 3.9–12.7)

## 2022-05-25 PROCEDURE — 82306 VITAMIN D 25 HYDROXY: CPT | Performed by: FAMILY MEDICINE

## 2022-05-25 PROCEDURE — 80053 COMPREHEN METABOLIC PANEL: CPT | Performed by: FAMILY MEDICINE

## 2022-05-25 PROCEDURE — 3074F SYST BP LT 130 MM HG: CPT | Mod: CPTII,S$GLB,, | Performed by: FAMILY MEDICINE

## 2022-05-25 PROCEDURE — 3288F PR FALLS RISK ASSESSMENT DOCUMENTED: ICD-10-PCS | Mod: CPTII,S$GLB,, | Performed by: FAMILY MEDICINE

## 2022-05-25 PROCEDURE — 1101F PT FALLS ASSESS-DOCD LE1/YR: CPT | Mod: CPTII,S$GLB,, | Performed by: FAMILY MEDICINE

## 2022-05-25 PROCEDURE — 3008F PR BODY MASS INDEX (BMI) DOCUMENTED: ICD-10-PCS | Mod: CPTII,S$GLB,, | Performed by: FAMILY MEDICINE

## 2022-05-25 PROCEDURE — 3074F PR MOST RECENT SYSTOLIC BLOOD PRESSURE < 130 MM HG: ICD-10-PCS | Mod: CPTII,S$GLB,, | Performed by: FAMILY MEDICINE

## 2022-05-25 PROCEDURE — 99999 PR PBB SHADOW E&M-EST. PATIENT-LVL III: CPT | Mod: PBBFAC,,, | Performed by: FAMILY MEDICINE

## 2022-05-25 PROCEDURE — 3080F DIAST BP >= 90 MM HG: CPT | Mod: CPTII,S$GLB,, | Performed by: FAMILY MEDICINE

## 2022-05-25 PROCEDURE — 80061 LIPID PANEL: CPT | Performed by: FAMILY MEDICINE

## 2022-05-25 PROCEDURE — 81003 URINALYSIS AUTO W/O SCOPE: CPT | Performed by: FAMILY MEDICINE

## 2022-05-25 PROCEDURE — 3288F FALL RISK ASSESSMENT DOCD: CPT | Mod: CPTII,S$GLB,, | Performed by: FAMILY MEDICINE

## 2022-05-25 PROCEDURE — 1101F PR PT FALLS ASSESS DOC 0-1 FALLS W/OUT INJ PAST YR: ICD-10-PCS | Mod: CPTII,S$GLB,, | Performed by: FAMILY MEDICINE

## 2022-05-25 PROCEDURE — 85025 COMPLETE CBC W/AUTO DIFF WBC: CPT | Performed by: FAMILY MEDICINE

## 2022-05-25 PROCEDURE — 99999 PR PBB SHADOW E&M-EST. PATIENT-LVL III: ICD-10-PCS | Mod: PBBFAC,,, | Performed by: FAMILY MEDICINE

## 2022-05-25 PROCEDURE — 99214 PR OFFICE/OUTPT VISIT, EST, LEVL IV, 30-39 MIN: ICD-10-PCS | Mod: S$GLB,,, | Performed by: FAMILY MEDICINE

## 2022-05-25 PROCEDURE — 1125F PR PAIN SEVERITY QUANTIFIED, PAIN PRESENT: ICD-10-PCS | Mod: CPTII,S$GLB,, | Performed by: FAMILY MEDICINE

## 2022-05-25 PROCEDURE — 3080F PR MOST RECENT DIASTOLIC BLOOD PRESSURE >= 90 MM HG: ICD-10-PCS | Mod: CPTII,S$GLB,, | Performed by: FAMILY MEDICINE

## 2022-05-25 PROCEDURE — 99214 OFFICE O/P EST MOD 30 MIN: CPT | Mod: S$GLB,,, | Performed by: FAMILY MEDICINE

## 2022-05-25 PROCEDURE — 1125F AMNT PAIN NOTED PAIN PRSNT: CPT | Mod: CPTII,S$GLB,, | Performed by: FAMILY MEDICINE

## 2022-05-25 PROCEDURE — 36415 COLL VENOUS BLD VENIPUNCTURE: CPT | Performed by: FAMILY MEDICINE

## 2022-05-25 PROCEDURE — 3008F BODY MASS INDEX DOCD: CPT | Mod: CPTII,S$GLB,, | Performed by: FAMILY MEDICINE

## 2022-05-25 NOTE — PROGRESS NOTES
Subjective:       Patient ID: Raffi Amaro is a 72 y.o. female.    Chief Complaint: Back Pain (Sciatic nerve )    HPI  72 year old female comes in after recent flare up in her back. She says she had terrible pain in her left side and her back with radiation. This has happened before, but she was not able to drive or get out to get medicine this time. She lost her  this year. She is doing well overall though she is grieving him as they were together for a very long beautiful marriage.     She really doesn't have many complaints today.    She still declines any testing - citing that she has no one to bring her to testing or treatment if something is found.    PMH, PSH, ALLERGIES, SH, FH reviewed in nurse's notes above  Medications reconciled in the nurse's notes      Review of Systems   Constitutional: Negative for chills and fever.   HENT: Negative for congestion, ear pain, postnasal drip, rhinorrhea, sore throat and trouble swallowing.    Eyes: Negative for redness and itching.   Respiratory: Negative for cough, shortness of breath and wheezing.    Cardiovascular: Negative for chest pain and palpitations.   Gastrointestinal: Negative for abdominal pain, diarrhea, nausea and vomiting.   Genitourinary: Negative for dysuria and frequency.   Musculoskeletal: Positive for back pain.   Skin: Negative for rash.   Neurological: Negative for weakness and headaches.       Objective:      Physical Exam  Vitals and nursing note reviewed.   Constitutional:       General: She is not in acute distress.     Appearance: She is well-developed.   HENT:      Head: Normocephalic and atraumatic.   Eyes:      Conjunctiva/sclera: Conjunctivae normal.      Pupils: Pupils are equal, round, and reactive to light.   Neck:      Thyroid: No thyromegaly.   Cardiovascular:      Rate and Rhythm: Normal rate and regular rhythm.      Heart sounds: Normal heart sounds.   Pulmonary:      Effort: Pulmonary effort is normal. No respiratory  distress.      Breath sounds: Normal breath sounds. No wheezing.   Abdominal:      General: Bowel sounds are normal.      Palpations: Abdomen is soft.      Tenderness: There is no abdominal tenderness.   Musculoskeletal:         General: Normal range of motion.      Cervical back: Normal range of motion and neck supple.   Lymphadenopathy:      Cervical: No cervical adenopathy.   Skin:     General: Skin is warm and dry.      Findings: No rash.   Neurological:      Mental Status: She is alert and oriented to person, place, and time.   Psychiatric:         Behavior: Behavior normal.          Assessment/Plan:       Problem List Items Addressed This Visit    None     Visit Diagnoses     Well woman exam    -  Primary    Relevant Orders    Lipid Panel (Completed)    Comprehensive Metabolic Panel (Completed)    CBC Auto Differential (Completed)    CBC Auto Differential    Comprehensive Metabolic Panel    Lipid Panel    Osteoporosis, unspecified osteoporosis type, unspecified pathological fracture presence        Relevant Orders    Vitamin D (Completed)    Vitamin D    Visit for screening mammogram        Relevant Orders    Mammo Digital Screening Bilat w/ Ming    Dysuria        Relevant Orders    POCT Urine Sediment Exam    POCT URINE DIPSTICK WITH MICROSCOPE, AUTOMATED    Urinalysis, Reflex to Urine Culture Urine, Clean Catch (Completed)    CBC Auto Differential      RTC if condition acutely worsens or any other concerns, otherwise RTC as scheduled

## 2022-06-01 DIAGNOSIS — Z12.11 COLON CANCER SCREENING: ICD-10-CM

## 2022-06-07 ENCOUNTER — TELEPHONE (OUTPATIENT)
Dept: FAMILY MEDICINE | Facility: CLINIC | Age: 72
End: 2022-06-07
Payer: MEDICARE

## 2022-06-07 DIAGNOSIS — Z12.31 BREAST CANCER SCREENING BY MAMMOGRAM: Primary | ICD-10-CM

## 2022-06-07 NOTE — TELEPHONE ENCOUNTER
Spoke with patient and she states that new MMG order is needing to be placed for patient to get done a [facility].     MMG order placed for external, and patient will  at appointment tomorrow with Dr. Bryson.

## 2022-06-07 NOTE — TELEPHONE ENCOUNTER
----- Message from Ivan Joseph sent at 2022  9:28 AM CDT -----  Contact: Patient  Raffi Amaro  MRN: [id number]  : 1950  PCP: Yahaira Bryson  Home Phone      [phone]  Work Phone      Not on file.  Mobile          [phone]      MESSAGE: has MMG appt @ [facility] -- requesting paper work to bring them tomorrow    Call [phone]    PCP: [place]

## 2022-06-08 ENCOUNTER — OFFICE VISIT (OUTPATIENT)
Dept: FAMILY MEDICINE | Facility: CLINIC | Age: 72
End: 2022-06-08
Payer: MEDICARE

## 2022-06-08 VITALS
SYSTOLIC BLOOD PRESSURE: 136 MMHG | HEART RATE: 80 BPM | RESPIRATION RATE: 18 BRPM | WEIGHT: 189.38 LBS | HEIGHT: 65 IN | DIASTOLIC BLOOD PRESSURE: 88 MMHG | BODY MASS INDEX: 31.55 KG/M2

## 2022-06-08 DIAGNOSIS — M62.830 SPASM OF LUMBAR PARASPINOUS MUSCLE: ICD-10-CM

## 2022-06-08 DIAGNOSIS — Z01.419 WELL WOMAN EXAM: Primary | ICD-10-CM

## 2022-06-08 PROCEDURE — 99999 PR PBB SHADOW E&M-EST. PATIENT-LVL III: CPT | Mod: PBBFAC,,, | Performed by: FAMILY MEDICINE

## 2022-06-08 PROCEDURE — 3079F PR MOST RECENT DIASTOLIC BLOOD PRESSURE 80-89 MM HG: ICD-10-PCS | Mod: CPTII,S$GLB,, | Performed by: FAMILY MEDICINE

## 2022-06-08 PROCEDURE — 3288F PR FALLS RISK ASSESSMENT DOCUMENTED: ICD-10-PCS | Mod: CPTII,S$GLB,, | Performed by: FAMILY MEDICINE

## 2022-06-08 PROCEDURE — 3008F BODY MASS INDEX DOCD: CPT | Mod: CPTII,S$GLB,, | Performed by: FAMILY MEDICINE

## 2022-06-08 PROCEDURE — 99397 PR PREVENTIVE VISIT,EST,65 & OVER: ICD-10-PCS | Mod: S$GLB,,, | Performed by: FAMILY MEDICINE

## 2022-06-08 PROCEDURE — 99397 PER PM REEVAL EST PAT 65+ YR: CPT | Mod: S$GLB,,, | Performed by: FAMILY MEDICINE

## 2022-06-08 PROCEDURE — 1126F PR PAIN SEVERITY QUANTIFIED, NO PAIN PRESENT: ICD-10-PCS | Mod: CPTII,S$GLB,, | Performed by: FAMILY MEDICINE

## 2022-06-08 PROCEDURE — 1101F PT FALLS ASSESS-DOCD LE1/YR: CPT | Mod: CPTII,S$GLB,, | Performed by: FAMILY MEDICINE

## 2022-06-08 PROCEDURE — 1159F MED LIST DOCD IN RCRD: CPT | Mod: CPTII,S$GLB,, | Performed by: FAMILY MEDICINE

## 2022-06-08 PROCEDURE — 1101F PR PT FALLS ASSESS DOC 0-1 FALLS W/OUT INJ PAST YR: ICD-10-PCS | Mod: CPTII,S$GLB,, | Performed by: FAMILY MEDICINE

## 2022-06-08 PROCEDURE — 3075F SYST BP GE 130 - 139MM HG: CPT | Mod: CPTII,S$GLB,, | Performed by: FAMILY MEDICINE

## 2022-06-08 PROCEDURE — 99999 PR PBB SHADOW E&M-EST. PATIENT-LVL III: ICD-10-PCS | Mod: PBBFAC,,, | Performed by: FAMILY MEDICINE

## 2022-06-08 PROCEDURE — 3008F PR BODY MASS INDEX (BMI) DOCUMENTED: ICD-10-PCS | Mod: CPTII,S$GLB,, | Performed by: FAMILY MEDICINE

## 2022-06-08 PROCEDURE — 3079F DIAST BP 80-89 MM HG: CPT | Mod: CPTII,S$GLB,, | Performed by: FAMILY MEDICINE

## 2022-06-08 PROCEDURE — 1159F PR MEDICATION LIST DOCUMENTED IN MEDICAL RECORD: ICD-10-PCS | Mod: CPTII,S$GLB,, | Performed by: FAMILY MEDICINE

## 2022-06-08 PROCEDURE — 3288F FALL RISK ASSESSMENT DOCD: CPT | Mod: CPTII,S$GLB,, | Performed by: FAMILY MEDICINE

## 2022-06-08 PROCEDURE — 3075F PR MOST RECENT SYSTOLIC BLOOD PRESS GE 130-139MM HG: ICD-10-PCS | Mod: CPTII,S$GLB,, | Performed by: FAMILY MEDICINE

## 2022-06-08 PROCEDURE — 1126F AMNT PAIN NOTED NONE PRSNT: CPT | Mod: CPTII,S$GLB,, | Performed by: FAMILY MEDICINE

## 2022-06-08 RX ORDER — METHYLPREDNISOLONE 4 MG/1
TABLET ORAL
Qty: 1 EACH | Refills: 0 | Status: SHIPPED | OUTPATIENT
Start: 2022-06-08 | End: 2022-06-15

## 2022-06-08 RX ORDER — METHOCARBAMOL 500 MG/1
500 TABLET, FILM COATED ORAL 3 TIMES DAILY PRN
Qty: 40 TABLET | Refills: 0 | Status: SHIPPED | OUTPATIENT
Start: 2022-06-08 | End: 2022-06-15

## 2022-06-08 NOTE — PROGRESS NOTES
Subjective:       Patient ID: Raffi Amaro is a 72 y.o. female.    Chief Complaint: Follow-up (Patient here today for 2 week follow up/)    HPI  72 year old female comes in for f/u of her labs.    Her back is completely normal again.     Discussed health maint. She declines any colon cancer screening.    PMH, PSH, ALLERGIES, SH, FH reviewed in nurse's notes above  Medications reconciled in the nurse's notes      Review of Systems   Constitutional: Negative for chills and fever.   HENT: Negative for congestion, ear pain, postnasal drip, rhinorrhea, sore throat and trouble swallowing.    Eyes: Negative for redness and itching.   Respiratory: Negative for cough, shortness of breath and wheezing.    Cardiovascular: Negative for chest pain and palpitations.   Gastrointestinal: Negative for abdominal pain, diarrhea, nausea and vomiting.   Genitourinary: Negative for dysuria and frequency.   Skin: Negative for rash.   Neurological: Negative for weakness and headaches.       Objective:      Physical Exam  Vitals and nursing note reviewed.   Constitutional:       General: She is not in acute distress.     Appearance: She is well-developed.   HENT:      Head: Normocephalic and atraumatic.   Eyes:      Conjunctiva/sclera: Conjunctivae normal.      Pupils: Pupils are equal, round, and reactive to light.   Neck:      Thyroid: No thyromegaly.   Cardiovascular:      Rate and Rhythm: Normal rate and regular rhythm.      Heart sounds: Normal heart sounds.   Pulmonary:      Effort: Pulmonary effort is normal. No respiratory distress.      Breath sounds: Normal breath sounds. No wheezing.   Abdominal:      General: Bowel sounds are normal.      Palpations: Abdomen is soft.      Tenderness: There is no abdominal tenderness.   Musculoskeletal:         General: Normal range of motion.      Cervical back: Normal range of motion and neck supple.   Lymphadenopathy:      Cervical: No cervical adenopathy.   Skin:     General: Skin is warm  and dry.      Findings: No rash.   Neurological:      Mental Status: She is alert and oriented to person, place, and time.   Psychiatric:         Behavior: Behavior normal.          Assessment/Plan:       Problem List Items Addressed This Visit    None     Visit Diagnoses     Well woman exam    -  Primary    Spasm of lumbar paraspinous muscle        Relevant Medications    methocarbamoL (ROBAXIN) 500 MG Tab      RTC if condition acutely worsens or any other concerns, otherwise RTC as scheduled

## 2022-06-15 ENCOUNTER — OFFICE VISIT (OUTPATIENT)
Dept: FAMILY MEDICINE | Facility: CLINIC | Age: 72
End: 2022-06-15
Payer: MEDICARE

## 2022-06-15 VITALS
SYSTOLIC BLOOD PRESSURE: 132 MMHG | WEIGHT: 191.38 LBS | RESPIRATION RATE: 16 BRPM | BODY MASS INDEX: 31.89 KG/M2 | DIASTOLIC BLOOD PRESSURE: 79 MMHG | HEART RATE: 68 BPM | HEIGHT: 65 IN

## 2022-06-15 DIAGNOSIS — Z71.2 ENCOUNTER TO DISCUSS TEST RESULTS: Primary | ICD-10-CM

## 2022-06-15 PROCEDURE — 1126F PR PAIN SEVERITY QUANTIFIED, NO PAIN PRESENT: ICD-10-PCS | Mod: CPTII,S$GLB,, | Performed by: FAMILY MEDICINE

## 2022-06-15 PROCEDURE — 3078F DIAST BP <80 MM HG: CPT | Mod: CPTII,S$GLB,, | Performed by: FAMILY MEDICINE

## 2022-06-15 PROCEDURE — 99212 PR OFFICE/OUTPT VISIT, EST, LEVL II, 10-19 MIN: ICD-10-PCS | Mod: S$GLB,,, | Performed by: FAMILY MEDICINE

## 2022-06-15 PROCEDURE — 1159F PR MEDICATION LIST DOCUMENTED IN MEDICAL RECORD: ICD-10-PCS | Mod: CPTII,S$GLB,, | Performed by: FAMILY MEDICINE

## 2022-06-15 PROCEDURE — 3288F PR FALLS RISK ASSESSMENT DOCUMENTED: ICD-10-PCS | Mod: CPTII,S$GLB,, | Performed by: FAMILY MEDICINE

## 2022-06-15 PROCEDURE — 99212 OFFICE O/P EST SF 10 MIN: CPT | Mod: S$GLB,,, | Performed by: FAMILY MEDICINE

## 2022-06-15 PROCEDURE — 1126F AMNT PAIN NOTED NONE PRSNT: CPT | Mod: CPTII,S$GLB,, | Performed by: FAMILY MEDICINE

## 2022-06-15 PROCEDURE — 99999 PR PBB SHADOW E&M-EST. PATIENT-LVL III: ICD-10-PCS | Mod: PBBFAC,,, | Performed by: FAMILY MEDICINE

## 2022-06-15 PROCEDURE — 1101F PR PT FALLS ASSESS DOC 0-1 FALLS W/OUT INJ PAST YR: ICD-10-PCS | Mod: CPTII,S$GLB,, | Performed by: FAMILY MEDICINE

## 2022-06-15 PROCEDURE — 3075F SYST BP GE 130 - 139MM HG: CPT | Mod: CPTII,S$GLB,, | Performed by: FAMILY MEDICINE

## 2022-06-15 PROCEDURE — 3008F BODY MASS INDEX DOCD: CPT | Mod: CPTII,S$GLB,, | Performed by: FAMILY MEDICINE

## 2022-06-15 PROCEDURE — 3078F PR MOST RECENT DIASTOLIC BLOOD PRESSURE < 80 MM HG: ICD-10-PCS | Mod: CPTII,S$GLB,, | Performed by: FAMILY MEDICINE

## 2022-06-15 PROCEDURE — 99999 PR PBB SHADOW E&M-EST. PATIENT-LVL III: CPT | Mod: PBBFAC,,, | Performed by: FAMILY MEDICINE

## 2022-06-15 PROCEDURE — 3288F FALL RISK ASSESSMENT DOCD: CPT | Mod: CPTII,S$GLB,, | Performed by: FAMILY MEDICINE

## 2022-06-15 PROCEDURE — 1101F PT FALLS ASSESS-DOCD LE1/YR: CPT | Mod: CPTII,S$GLB,, | Performed by: FAMILY MEDICINE

## 2022-06-15 PROCEDURE — 3008F PR BODY MASS INDEX (BMI) DOCUMENTED: ICD-10-PCS | Mod: CPTII,S$GLB,, | Performed by: FAMILY MEDICINE

## 2022-06-15 PROCEDURE — 1159F MED LIST DOCD IN RCRD: CPT | Mod: CPTII,S$GLB,, | Performed by: FAMILY MEDICINE

## 2022-06-15 PROCEDURE — 3075F PR MOST RECENT SYSTOLIC BLOOD PRESS GE 130-139MM HG: ICD-10-PCS | Mod: CPTII,S$GLB,, | Performed by: FAMILY MEDICINE

## 2022-06-15 NOTE — PROGRESS NOTES
Subjective:       Patient ID: Raffi Amaro is a 72 y.o. female.    Chief Complaint: Results (MMG)    HPI  72 year old female comes in for her mammogram results.    PMH, PSH, ALLERGIES, SH, FH reviewed in nurse's notes above  Medications reconciled in the nurse's notes\    Review of Systems   Constitutional: Negative for chills and fever.   HENT: Negative for congestion, ear pain, postnasal drip, rhinorrhea, sore throat and trouble swallowing.    Eyes: Negative for redness and itching.   Respiratory: Negative for cough, shortness of breath and wheezing.    Cardiovascular: Negative for chest pain and palpitations.   Gastrointestinal: Negative for abdominal pain, diarrhea, nausea and vomiting.   Genitourinary: Negative for dysuria and frequency.   Skin: Negative for rash.   Neurological: Negative for weakness and headaches.       Objective:      Physical Exam  Vitals and nursing note reviewed.   Constitutional:       General: She is not in acute distress.     Appearance: She is well-developed.   HENT:      Head: Normocephalic and atraumatic.   Eyes:      Conjunctiva/sclera: Conjunctivae normal.      Pupils: Pupils are equal, round, and reactive to light.   Neck:      Thyroid: No thyromegaly.   Cardiovascular:      Rate and Rhythm: Normal rate and regular rhythm.      Heart sounds: Normal heart sounds.   Pulmonary:      Effort: Pulmonary effort is normal. No respiratory distress.      Breath sounds: Normal breath sounds. No wheezing.   Abdominal:      General: Bowel sounds are normal.      Palpations: Abdomen is soft.      Tenderness: There is no abdominal tenderness.   Musculoskeletal:         General: Normal range of motion.      Cervical back: Normal range of motion and neck supple.   Lymphadenopathy:      Cervical: No cervical adenopathy.   Skin:     General: Skin is warm and dry.      Findings: No rash.   Neurological:      Mental Status: She is alert and oriented to person, place, and time.   Psychiatric:          Behavior: Behavior normal.          Assessment/Plan:       Problem List Items Addressed This Visit    None     Visit Diagnoses     Encounter to discuss test results    -  Primary        RTC if condition acutely worsens or any other concerns, otherwise RTC as scheduled

## 2022-08-16 NOTE — TELEPHONE ENCOUNTER
----- Message from Ivan Joseph sent at 2022  8:26 AM CDT -----  Contact: Patient  Raffi Amaro  MRN: [id number]  : 1950  PCP: Yahaira Bryson  Home Phone      [phone]  Work Phone      Not on file.  Mobile          [phone]      MESSAGE:   Pt requesting refill or new Rx.   Is this a refill or new RX:  refill  RX name and strength: Alendronate 70 mg  Last office visit: 6/15/22  Is this a 30-day or 90-day RX:  90 day  Pharmacy name and location:  [facility]  Comments:      Phone:  [phone]    PCP: [place]

## 2022-08-16 NOTE — TELEPHONE ENCOUNTER
LOV 6/15/2022    Pt requesting RX refill for alendronate (FOSAMAX) 70 MG tablet      pharmacy confirmed   rx pending  please advise

## 2022-08-17 RX ORDER — ALENDRONATE SODIUM 70 MG/1
70 TABLET ORAL
Qty: 12 TABLET | Refills: 3 | Status: SHIPPED | OUTPATIENT
Start: 2022-08-17 | End: 2023-08-03

## 2023-04-25 ENCOUNTER — PATIENT OUTREACH (OUTPATIENT)
Dept: ADMINISTRATIVE | Facility: HOSPITAL | Age: 73
End: 2023-04-25
Payer: MEDICARE

## 2023-04-25 NOTE — PROGRESS NOTES
Chart reviewed, immunization record updated.  No new results noted on [facility] or [facility] web site.  Care Everywhere updated.   Patient care coordination note  LOV with PCP 6/15/2022.  Attempted to contact patient to discuss Colorectal Cancer Screening and scheduling MMG for 6/2023, unable to contact. (Voicemail box has not been set up)

## 2023-05-10 ENCOUNTER — PES CALL (OUTPATIENT)
Dept: ADMINISTRATIVE | Facility: CLINIC | Age: 73
End: 2023-05-10
Payer: MEDICARE

## 2023-08-03 RX ORDER — ALENDRONATE SODIUM 70 MG/1
TABLET ORAL
Qty: 12 TABLET | Refills: 3 | Status: SHIPPED | OUTPATIENT
Start: 2023-08-03 | End: 2023-10-10

## 2023-08-03 NOTE — TELEPHONE ENCOUNTER
LOV 06/15/2022    Patient requesting refill for alendronate (FOSAMAX) 70 MG tablet    Requested Prescriptions     Pending Prescriptions Disp Refills    alendronate (FOSAMAX) 70 MG tablet [Pharmacy Med Name: ALENDRONATE SODIUM 70 MG Tablet] 12 tablet 3     Sig: TAKE 1 TABLET EVERY 7 DAYS       Medication pended/pharmacy confirmed, please advise.

## 2023-08-16 ENCOUNTER — PES CALL (OUTPATIENT)
Dept: ADMINISTRATIVE | Facility: CLINIC | Age: 73
End: 2023-08-16
Payer: MEDICARE

## 2023-09-27 ENCOUNTER — OFFICE VISIT (OUTPATIENT)
Dept: FAMILY MEDICINE | Facility: CLINIC | Age: 73
End: 2023-09-27
Payer: MEDICARE

## 2023-09-27 ENCOUNTER — LAB VISIT (OUTPATIENT)
Dept: FAMILY MEDICINE | Facility: CLINIC | Age: 73
End: 2023-09-27
Payer: MEDICARE

## 2023-09-27 VITALS
DIASTOLIC BLOOD PRESSURE: 86 MMHG | WEIGHT: 198.44 LBS | RESPIRATION RATE: 16 BRPM | BODY MASS INDEX: 33.06 KG/M2 | HEIGHT: 65 IN | HEART RATE: 68 BPM | SYSTOLIC BLOOD PRESSURE: 128 MMHG

## 2023-09-27 DIAGNOSIS — Z00.00 VISIT FOR ANNUAL HEALTH EXAMINATION: ICD-10-CM

## 2023-09-27 DIAGNOSIS — Z12.31 VISIT FOR SCREENING MAMMOGRAM: ICD-10-CM

## 2023-09-27 DIAGNOSIS — M81.0 OSTEOPOROSIS, UNSPECIFIED OSTEOPOROSIS TYPE, UNSPECIFIED PATHOLOGICAL FRACTURE PRESENCE: ICD-10-CM

## 2023-09-27 DIAGNOSIS — M81.0 OSTEOPOROSIS, UNSPECIFIED OSTEOPOROSIS TYPE, UNSPECIFIED PATHOLOGICAL FRACTURE PRESENCE: Primary | ICD-10-CM

## 2023-09-27 LAB
ALBUMIN SERPL BCP-MCNC: 4.3 G/DL (ref 3.5–5.2)
ALP SERPL-CCNC: 65 U/L (ref 55–135)
ALT SERPL W/O P-5'-P-CCNC: 18 U/L (ref 10–44)
ANION GAP SERPL CALC-SCNC: 11 MMOL/L (ref 8–16)
AST SERPL-CCNC: 24 U/L (ref 10–40)
BILIRUB SERPL-MCNC: 0.9 MG/DL (ref 0.1–1)
BUN SERPL-MCNC: 15 MG/DL (ref 8–23)
CALCIUM SERPL-MCNC: 9.7 MG/DL (ref 8.7–10.5)
CHLORIDE SERPL-SCNC: 109 MMOL/L (ref 95–110)
CO2 SERPL-SCNC: 25 MMOL/L (ref 23–29)
CREAT SERPL-MCNC: 1.1 MG/DL (ref 0.5–1.4)
EST. GFR  (NO RACE VARIABLE): 53 ML/MIN/1.73 M^2
GLUCOSE SERPL-MCNC: 100 MG/DL (ref 70–110)
POTASSIUM SERPL-SCNC: 4.3 MMOL/L (ref 3.5–5.1)
PROT SERPL-MCNC: 7.6 G/DL (ref 6–8.4)
SODIUM SERPL-SCNC: 145 MMOL/L (ref 136–145)

## 2023-09-27 PROCEDURE — 99397 PER PM REEVAL EST PAT 65+ YR: CPT | Mod: S$GLB,,, | Performed by: FAMILY MEDICINE

## 2023-09-27 PROCEDURE — 99999 PR PBB SHADOW E&M-EST. PATIENT-LVL III: ICD-10-PCS | Mod: PBBFAC,,, | Performed by: FAMILY MEDICINE

## 2023-09-27 PROCEDURE — 36415 PR COLLECTION VENOUS BLOOD,VENIPUNCTURE: ICD-10-PCS | Mod: S$GLB,,, | Performed by: FAMILY MEDICINE

## 2023-09-27 PROCEDURE — 1159F PR MEDICATION LIST DOCUMENTED IN MEDICAL RECORD: ICD-10-PCS | Mod: CPTII,S$GLB,, | Performed by: FAMILY MEDICINE

## 2023-09-27 PROCEDURE — 1159F MED LIST DOCD IN RCRD: CPT | Mod: CPTII,S$GLB,, | Performed by: FAMILY MEDICINE

## 2023-09-27 PROCEDURE — 3079F PR MOST RECENT DIASTOLIC BLOOD PRESSURE 80-89 MM HG: ICD-10-PCS | Mod: CPTII,S$GLB,, | Performed by: FAMILY MEDICINE

## 2023-09-27 PROCEDURE — 3008F PR BODY MASS INDEX (BMI) DOCUMENTED: ICD-10-PCS | Mod: CPTII,S$GLB,, | Performed by: FAMILY MEDICINE

## 2023-09-27 PROCEDURE — 3008F BODY MASS INDEX DOCD: CPT | Mod: CPTII,S$GLB,, | Performed by: FAMILY MEDICINE

## 2023-09-27 PROCEDURE — 80053 COMPREHEN METABOLIC PANEL: CPT | Performed by: FAMILY MEDICINE

## 2023-09-27 PROCEDURE — 82306 VITAMIN D 25 HYDROXY: CPT | Performed by: FAMILY MEDICINE

## 2023-09-27 PROCEDURE — 99397 PR PREVENTIVE VISIT,EST,65 & OVER: ICD-10-PCS | Mod: S$GLB,,, | Performed by: FAMILY MEDICINE

## 2023-09-27 PROCEDURE — 90694 VACC AIIV4 NO PRSRV 0.5ML IM: CPT | Mod: S$GLB,,, | Performed by: FAMILY MEDICINE

## 2023-09-27 PROCEDURE — 1125F PR PAIN SEVERITY QUANTIFIED, PAIN PRESENT: ICD-10-PCS | Mod: CPTII,S$GLB,, | Performed by: FAMILY MEDICINE

## 2023-09-27 PROCEDURE — 3079F DIAST BP 80-89 MM HG: CPT | Mod: CPTII,S$GLB,, | Performed by: FAMILY MEDICINE

## 2023-09-27 PROCEDURE — 1101F PT FALLS ASSESS-DOCD LE1/YR: CPT | Mod: CPTII,S$GLB,, | Performed by: FAMILY MEDICINE

## 2023-09-27 PROCEDURE — G0008 ADMIN INFLUENZA VIRUS VAC: HCPCS | Mod: S$GLB,,, | Performed by: FAMILY MEDICINE

## 2023-09-27 PROCEDURE — 3074F SYST BP LT 130 MM HG: CPT | Mod: CPTII,S$GLB,, | Performed by: FAMILY MEDICINE

## 2023-09-27 PROCEDURE — 3288F PR FALLS RISK ASSESSMENT DOCUMENTED: ICD-10-PCS | Mod: CPTII,S$GLB,, | Performed by: FAMILY MEDICINE

## 2023-09-27 PROCEDURE — 1101F PR PT FALLS ASSESS DOC 0-1 FALLS W/OUT INJ PAST YR: ICD-10-PCS | Mod: CPTII,S$GLB,, | Performed by: FAMILY MEDICINE

## 2023-09-27 PROCEDURE — 1125F AMNT PAIN NOTED PAIN PRSNT: CPT | Mod: CPTII,S$GLB,, | Performed by: FAMILY MEDICINE

## 2023-09-27 PROCEDURE — 90694 FLU VACCINE - QUADRIVALENT - ADJUVANTED: ICD-10-PCS | Mod: S$GLB,,, | Performed by: FAMILY MEDICINE

## 2023-09-27 PROCEDURE — 3288F FALL RISK ASSESSMENT DOCD: CPT | Mod: CPTII,S$GLB,, | Performed by: FAMILY MEDICINE

## 2023-09-27 PROCEDURE — 3074F PR MOST RECENT SYSTOLIC BLOOD PRESSURE < 130 MM HG: ICD-10-PCS | Mod: CPTII,S$GLB,, | Performed by: FAMILY MEDICINE

## 2023-09-27 PROCEDURE — 36415 COLL VENOUS BLD VENIPUNCTURE: CPT | Mod: S$GLB,,, | Performed by: FAMILY MEDICINE

## 2023-09-27 PROCEDURE — 99999 PR PBB SHADOW E&M-EST. PATIENT-LVL III: CPT | Mod: PBBFAC,,, | Performed by: FAMILY MEDICINE

## 2023-09-27 PROCEDURE — G0008 FLU VACCINE - QUADRIVALENT - ADJUVANTED: ICD-10-PCS | Mod: S$GLB,,, | Performed by: FAMILY MEDICINE

## 2023-09-27 NOTE — PROGRESS NOTES
Subjective:       Patient ID: Raffi Amaro is a 73 y.o. female.    Chief Complaint: Annual Exam    HPI  73 year old female come sin for annual exam.  She has no complaints today.    She says that she doesn't want to have any screening for colon cnacer.    PMH, PSH, ALLERGIES, SH, FH reviewed in nurse's notes above  Medications reconciled in the nurse's notes      Review of Systems   Constitutional:  Negative for chills and fever.   HENT:  Negative for congestion, ear pain, postnasal drip, rhinorrhea, sore throat and trouble swallowing.    Eyes:  Negative for redness and itching.   Respiratory:  Negative for cough, shortness of breath and wheezing.    Cardiovascular:  Negative for chest pain and palpitations.   Gastrointestinal:  Negative for abdominal pain, diarrhea, nausea and vomiting.   Genitourinary:  Negative for dysuria and frequency.   Musculoskeletal:  Positive for back pain.   Skin:  Negative for rash.   Neurological:  Negative for weakness and headaches.       Objective:      Physical Exam  Vitals and nursing note reviewed.   Constitutional:       General: She is not in acute distress.     Appearance: She is well-developed. She is obese.   HENT:      Head: Normocephalic and atraumatic.   Eyes:      Conjunctiva/sclera: Conjunctivae normal.      Pupils: Pupils are equal, round, and reactive to light.   Neck:      Thyroid: No thyromegaly.   Cardiovascular:      Rate and Rhythm: Normal rate and regular rhythm.      Heart sounds: Normal heart sounds.   Pulmonary:      Effort: Pulmonary effort is normal. No respiratory distress.      Breath sounds: Normal breath sounds. No wheezing.   Abdominal:      General: Bowel sounds are normal.      Palpations: Abdomen is soft.      Tenderness: There is no abdominal tenderness.   Musculoskeletal:         General: Normal range of motion.      Cervical back: Normal range of motion and neck supple.   Lymphadenopathy:      Cervical: No cervical adenopathy.   Skin:      General: Skin is warm and dry.      Findings: No rash.   Neurological:      Mental Status: She is alert and oriented to person, place, and time.   Psychiatric:         Behavior: Behavior normal.          Assessment/Plan:       Problem List Items Addressed This Visit    None  Visit Diagnoses       Osteoporosis, unspecified osteoporosis type, unspecified pathological fracture presence    -  Primary    Relevant Orders    Vitamin D    Visit for annual health examination        Relevant Orders    Comprehensive Metabolic Panel          RTC if condition acutely worsens or any other concerns, otherwise RTC as scheduled    Vaccines and health maint reviewed and advised. Patient declines CRS    Does not see any specialists.  No risks for depression, falls, hearing issues

## 2023-09-28 ENCOUNTER — TELEPHONE (OUTPATIENT)
Dept: FAMILY MEDICINE | Facility: CLINIC | Age: 73
End: 2023-09-28
Payer: MEDICARE

## 2023-09-28 LAB — 25(OH)D3+25(OH)D2 SERPL-MCNC: 58 NG/ML (ref 30–96)

## 2023-10-02 LAB — BCS RECOMMENDATION EXT: NORMAL

## 2023-10-10 ENCOUNTER — TELEPHONE (OUTPATIENT)
Dept: FAMILY MEDICINE | Facility: CLINIC | Age: 73
End: 2023-10-10
Payer: MEDICARE

## 2023-10-10 NOTE — TELEPHONE ENCOUNTER
Spoke with patient and she states that she has plenty enough medication of fosamax to last her until the end of year. Contacted center well to put a hold on medication. Informed patient.     Patient is asking if she only needs to take this until the end of the year and not take in 2024?

## 2023-10-10 NOTE — TELEPHONE ENCOUNTER
----- Message from Elyssa Islas sent at 10/10/2023  1:20 PM CDT -----  Contact: pt  Raffi Amaro  MRN: [id number]  : 1950  PCP: Yahaira Bryson  Home Phone      [phone]  Work Phone      Not on file.  Mobile          [phone]      MESSAGE:     Pt would like to get Dr. Bryson to discontinue alendronate (FOSAMAX) 70 MG tabletas she has enough to last her the rest of the year and she is unable to.      Raffi   [phone]

## 2023-10-13 ENCOUNTER — PATIENT OUTREACH (OUTPATIENT)
Dept: ADMINISTRATIVE | Facility: HOSPITAL | Age: 73
End: 2023-10-13
Payer: MEDICARE

## 2023-10-13 NOTE — PROGRESS NOTES
Chart reviewed, immunization record updated.  No new results noted on [facility] or [facility] web site.  Care Everywhere updated.   Patient care coordination note  LOV with PCP 9/27/2023.  Received external Mammogram collected/completed on 10/02/2023, updated to .  Patient declined Colorectal Cancer Screening.

## 2023-10-17 ENCOUNTER — TELEPHONE (OUTPATIENT)
Dept: FAMILY MEDICINE | Facility: CLINIC | Age: 73
End: 2023-10-17
Payer: MEDICARE

## 2023-10-17 NOTE — TELEPHONE ENCOUNTER
----- Message from Jess Calzada MA sent at 10/16/2023  9:20 AM CDT -----  Regarding: Medication  Contact: self  Raffi Amaro  MRN: [id number]  : 1950  PCP: Yahaira Bryson  Home Phone      [phone]  Work Phone      Not on file.  Mobile          [phone]      MESSAGE: Pt called stating a med she was ordered to take for 5 years is almost up, she has enough to last her until she is done taking it but her pharmacy called wanting her to refill it. She wants to know if you can call the pharmacy to cancel the meds, since she will not be taking it anymore.      [phone]

## 2023-11-09 ENCOUNTER — PATIENT OUTREACH (OUTPATIENT)
Dept: ADMINISTRATIVE | Facility: HOSPITAL | Age: 73
End: 2023-11-09
Payer: MEDICARE

## 2024-01-02 ENCOUNTER — TELEPHONE (OUTPATIENT)
Dept: FAMILY MEDICINE | Facility: CLINIC | Age: 74
End: 2024-01-02
Payer: MEDICARE

## 2024-01-02 NOTE — TELEPHONE ENCOUNTER
Raffi Amaro  MRN: [id number]  : 1950  PCP: Yahaira Bryson  Home Phone [phone]   Work Phone Not on file.   Mobile [phone]       MESSAGE: patient complaining of right shoulder pain, asking for an appointment sooner than the  appointment I can schedule.

## 2024-01-09 ENCOUNTER — OFFICE VISIT (OUTPATIENT)
Dept: FAMILY MEDICINE | Facility: CLINIC | Age: 74
End: 2024-01-09
Payer: MEDICARE

## 2024-01-09 VITALS
OXYGEN SATURATION: 98 % | HEIGHT: 65 IN | WEIGHT: 199.75 LBS | BODY MASS INDEX: 33.28 KG/M2 | SYSTOLIC BLOOD PRESSURE: 120 MMHG | HEART RATE: 82 BPM | RESPIRATION RATE: 18 BRPM | DIASTOLIC BLOOD PRESSURE: 80 MMHG

## 2024-01-09 DIAGNOSIS — M75.51 BURSITIS OF RIGHT DELTOID: Primary | ICD-10-CM

## 2024-01-09 PROCEDURE — 99213 OFFICE O/P EST LOW 20 MIN: CPT | Mod: HCNC,S$GLB,, | Performed by: FAMILY MEDICINE

## 2024-01-09 PROCEDURE — 3008F BODY MASS INDEX DOCD: CPT | Mod: HCNC,CPTII,S$GLB, | Performed by: FAMILY MEDICINE

## 2024-01-09 PROCEDURE — 1125F AMNT PAIN NOTED PAIN PRSNT: CPT | Mod: HCNC,CPTII,S$GLB, | Performed by: FAMILY MEDICINE

## 2024-01-09 PROCEDURE — 99999 PR PBB SHADOW E&M-EST. PATIENT-LVL III: CPT | Mod: PBBFAC,HCNC,, | Performed by: FAMILY MEDICINE

## 2024-01-09 PROCEDURE — 3079F DIAST BP 80-89 MM HG: CPT | Mod: HCNC,CPTII,S$GLB, | Performed by: FAMILY MEDICINE

## 2024-01-09 PROCEDURE — 1101F PT FALLS ASSESS-DOCD LE1/YR: CPT | Mod: HCNC,CPTII,S$GLB, | Performed by: FAMILY MEDICINE

## 2024-01-09 PROCEDURE — 3288F FALL RISK ASSESSMENT DOCD: CPT | Mod: HCNC,CPTII,S$GLB, | Performed by: FAMILY MEDICINE

## 2024-01-09 PROCEDURE — 3074F SYST BP LT 130 MM HG: CPT | Mod: HCNC,CPTII,S$GLB, | Performed by: FAMILY MEDICINE

## 2024-01-09 RX ORDER — DICLOFENAC SODIUM AND MISOPROSTOL 75; 200 MG/1; UG/1
1 TABLET, DELAYED RELEASE ORAL 2 TIMES DAILY
Qty: 60 TABLET | Refills: 0 | Status: SHIPPED | OUTPATIENT
Start: 2024-01-09 | End: 2024-02-21

## 2024-01-09 RX ORDER — MULTIVIT WITH MINERALS/HERBS
1 TABLET ORAL DAILY
COMMUNITY

## 2024-01-09 NOTE — PROGRESS NOTES
Subjective:       Patient ID: Raffi Amaro is a 73 y.o. female.    Chief Complaint: Shoulder Pain (Pt is here for right shoulder pain that started last summer.)    HPI  73 year oldf corey comes inw ith c/o shoulder pain that has been going on for months. She's says that this is from the middle of her shoulder to mid arm. She ays that she feels like she did pull a muscle originially and it is now hurting worse in certain positions. No numbness. No weakness. Limited by pain only.    PMH, PSH, ALLERGIES, SH, FH reviewed in nurse's notes above  Medications reconciled in the nurse's notes      Review of Systems   Constitutional:  Negative for chills and fever.   HENT:  Negative for congestion, ear pain, postnasal drip, rhinorrhea, sore throat and trouble swallowing.    Eyes:  Negative for redness and itching.   Respiratory:  Negative for cough, shortness of breath and wheezing.    Cardiovascular:  Negative for chest pain and palpitations.   Gastrointestinal:  Negative for abdominal pain, diarrhea, nausea and vomiting.   Genitourinary:  Negative for dysuria and frequency.   Musculoskeletal:  Positive for arthralgias and myalgias.   Skin:  Negative for rash.   Neurological:  Negative for weakness and headaches.       Objective:      Physical Exam  Vitals and nursing note reviewed.   Constitutional:       General: She is not in acute distress.     Appearance: She is well-developed.   HENT:      Head: Normocephalic and atraumatic.   Eyes:      Conjunctiva/sclera: Conjunctivae normal.      Pupils: Pupils are equal, round, and reactive to light.   Neck:      Thyroid: No thyromegaly.   Cardiovascular:      Rate and Rhythm: Normal rate and regular rhythm.      Heart sounds: Normal heart sounds.   Pulmonary:      Effort: Pulmonary effort is normal. No respiratory distress.      Breath sounds: Normal breath sounds. No wheezing.   Abdominal:      General: Bowel sounds are normal.      Palpations: Abdomen is soft.       Tenderness: There is no abdominal tenderness.   Musculoskeletal:         General: Tenderness present.      Cervical back: Normal range of motion and neck supple.      Comments: Negative impingement signs 1 and 2  4/5 supraspinatous   4/5 subscapularis  4/5 teres minor and infraspinatous       Lymphadenopathy:      Cervical: No cervical adenopathy.   Skin:     General: Skin is warm and dry.      Findings: No rash.   Neurological:      Mental Status: She is alert and oriented to person, place, and time.   Psychiatric:         Behavior: Behavior normal.          Assessment/Plan:       Problem List Items Addressed This Visit    None  Visit Diagnoses       Bursitis of right deltoid    -  Primary    Relevant Medications    diclofenac-misoprostol  mg-mcg (ARTHROTEC 75)  mg-mcg per tablet    Other Relevant Orders    Ambulatory referral/consult to Physical/Occupational Therapy    SLING FOR HOME USE        RTC if condition acutely worsens or any other concerns, otherwise RTC as scheduled

## 2024-02-06 DIAGNOSIS — Z12.11 COLON CANCER SCREENING: ICD-10-CM

## 2024-02-19 ENCOUNTER — TELEPHONE (OUTPATIENT)
Dept: FAMILY MEDICINE | Facility: CLINIC | Age: 74
End: 2024-02-19
Payer: MEDICARE

## 2024-02-19 NOTE — TELEPHONE ENCOUNTER
----- Message from Ivan Joseph sent at 2/15/2024  3:55 PM CST -----  Contact: Patient  Raffi Amaro  MRN: [id number]  : 1950  PCP: Yahaira Bryson  Home Phone      [phone]  Work Phone      Not on file.  Mobile          [phone]      MESSAGE: will finish therapy on  - was told to see Dr Bryson once completed - requesting appt sooner than next available    Call [phone]    PCP: [place]

## 2024-02-19 NOTE — TELEPHONE ENCOUNTER
Contacted and spoke to pt, scheduled available appt for Feb 21 at 0845. Pt verbalized understanding.

## 2024-02-21 ENCOUNTER — OFFICE VISIT (OUTPATIENT)
Dept: FAMILY MEDICINE | Facility: CLINIC | Age: 74
End: 2024-02-21
Payer: MEDICARE

## 2024-02-21 VITALS
WEIGHT: 197.75 LBS | DIASTOLIC BLOOD PRESSURE: 86 MMHG | BODY MASS INDEX: 32.95 KG/M2 | HEIGHT: 65 IN | HEART RATE: 72 BPM | RESPIRATION RATE: 20 BRPM | SYSTOLIC BLOOD PRESSURE: 132 MMHG

## 2024-02-21 DIAGNOSIS — K20.80 PILL ESOPHAGITIS: ICD-10-CM

## 2024-02-21 DIAGNOSIS — M81.0 OSTEOPOROSIS, UNSPECIFIED OSTEOPOROSIS TYPE, UNSPECIFIED PATHOLOGICAL FRACTURE PRESENCE: Primary | ICD-10-CM

## 2024-02-21 DIAGNOSIS — T50.905A PILL ESOPHAGITIS: ICD-10-CM

## 2024-02-21 DIAGNOSIS — M67.911 TENDINOPATHY OF RIGHT SHOULDER: ICD-10-CM

## 2024-02-21 PROCEDURE — 3079F DIAST BP 80-89 MM HG: CPT | Mod: HCNC,CPTII,S$GLB, | Performed by: FAMILY MEDICINE

## 2024-02-21 PROCEDURE — 1159F MED LIST DOCD IN RCRD: CPT | Mod: HCNC,CPTII,S$GLB, | Performed by: FAMILY MEDICINE

## 2024-02-21 PROCEDURE — 99999 PR PBB SHADOW E&M-EST. PATIENT-LVL III: CPT | Mod: PBBFAC,HCNC,, | Performed by: FAMILY MEDICINE

## 2024-02-21 PROCEDURE — 1125F AMNT PAIN NOTED PAIN PRSNT: CPT | Mod: HCNC,CPTII,S$GLB, | Performed by: FAMILY MEDICINE

## 2024-02-21 PROCEDURE — 1101F PT FALLS ASSESS-DOCD LE1/YR: CPT | Mod: HCNC,CPTII,S$GLB, | Performed by: FAMILY MEDICINE

## 2024-02-21 PROCEDURE — 99214 OFFICE O/P EST MOD 30 MIN: CPT | Mod: HCNC,S$GLB,, | Performed by: FAMILY MEDICINE

## 2024-02-21 PROCEDURE — 3075F SYST BP GE 130 - 139MM HG: CPT | Mod: HCNC,CPTII,S$GLB, | Performed by: FAMILY MEDICINE

## 2024-02-21 PROCEDURE — 3288F FALL RISK ASSESSMENT DOCD: CPT | Mod: HCNC,CPTII,S$GLB, | Performed by: FAMILY MEDICINE

## 2024-02-21 PROCEDURE — 3008F BODY MASS INDEX DOCD: CPT | Mod: HCNC,CPTII,S$GLB, | Performed by: FAMILY MEDICINE

## 2024-02-21 NOTE — PROGRESS NOTES
Subjective:       Patient ID: Raffi Amaro is a 74 y.o. female.    Chief Complaint: Follow-up (Follow up after 3 weeks of PT)    History of Present Illness  The patient presents for follow-up of physical therapy which was ordered for right shoulder tendonopathy    She rates her arm pain as 1 out of 10. Her shoulder pain is still present, but it is not as bad. She has been doing home therapy as well. Her range of motion is back to normal. She took the arthrotec for a while, but it would get stuck when she swallowed it. She took it for the first 2 weeks, but when she ate gumbo the rice seemed like it was tearing everything up in her esophagus. She felt like she was having acid reflux, but it is all better now.    Her diastolic blood pressure is slightly elevated today. She thinks she has whitecoat syndrome.    Her last DEXA scan was in 2021. She stopped the medication in 01/2024. She had a mammogram in 10/2024.    Objective:    Physical Exam  Vitals and nursing note reviewed.   Constitutional:       General: She is not in acute distress.     Appearance: She is well-developed.   HENT:      Head: Normocephalic and atraumatic.   Eyes:      Conjunctiva/sclera: Conjunctivae normal.      Pupils: Pupils are equal, round, and reactive to light.   Neck:      Thyroid: No thyromegaly.   Cardiovascular:      Rate and Rhythm: Normal rate and regular rhythm.      Heart sounds: Normal heart sounds.   Pulmonary:      Effort: Pulmonary effort is normal. No respiratory distress.      Breath sounds: Normal breath sounds. No wheezing.   Abdominal:      General: Bowel sounds are normal.      Palpations: Abdomen is soft.      Tenderness: There is no abdominal tenderness.   Musculoskeletal:         General: Normal range of motion.      Cervical back: Normal range of motion and neck supple.   Lymphadenopathy:      Cervical: No cervical adenopathy.   Skin:     General: Skin is warm and dry.      Findings: No rash.   Neurological:       Mental Status: She is alert and oriented to person, place, and time.   Psychiatric:         Behavior: Behavior normal.     \  Physical Exam  Pain in her right arm is down to a 1. Range of motion is almost completely back to normal. Full extension completely. She is able to abduct it, pull it away from her body, bring it all the way back down, and extend it all the way back.    Vital Signs  Blood pressure is 132/86.      Results    Assessment:         Assessment & Plan  1. Right shoulder pain.  Her range of motion is back to normal. We will discontinue physical therapy. She will continue doing the physical therapy at home.    2. Osteopenia.  I will order a DEXA scan.    3. Health maintenance.  Her last DEXA scan was in 2021. I will schedule her for a colonoscopy.    1. Osteoporosis, unspecified osteoporosis type, unspecified pathological fracture presence    2. Tendinopathy of right shoulder    3. Pill esophagitis        Plan:     Raffi was seen today for follow-up.    Diagnoses and all orders for this visit:    Osteoporosis, unspecified osteoporosis type, unspecified pathological fracture presence  -     DXA Bone Density Axial Skeleton 1 or more sites; Future  -     DXA Bone Density Axial Skeleton 1 or more sites    Tendinopathy of right shoulder    Pill esophagitis    Resolved. Cont home therapy. Okay to use arthrotec prn.    Elevated bp - discussed monitoring bp. Diet. Exercise. Avoidance of nacl.  No follow-ups on file.    This note was generated with the assistance of ambient listening technology. Verbal consent was obtained by the patient and accompanying visitor(s) for the recording of patient appointment to facilitate this note. I attest to having reviewed and edited the generated note for accuracy, though some syntax or spelling errors may persist. Please contact the author of this note for any clarification.

## 2024-02-27 ENCOUNTER — PATIENT OUTREACH (OUTPATIENT)
Dept: ADMINISTRATIVE | Facility: HOSPITAL | Age: 74
End: 2024-02-27
Payer: MEDICARE

## 2024-02-27 NOTE — PROGRESS NOTES
Colorectal Cancer Screening Gap Report  Chart reviewed, immunization record updated.  No new results noted on [facility] or [facility] web site.  Care Everywhere updated.   Patient care coordination note  LOV: 2/21/2024.  Discussed Colorectal Cancer Screenings, patient declined all Colorectal Cancer Screening options.

## 2024-05-02 ENCOUNTER — PATIENT OUTREACH (OUTPATIENT)
Dept: ADMINISTRATIVE | Facility: HOSPITAL | Age: 74
End: 2024-05-02
Payer: MEDICARE

## 2024-05-02 NOTE — PROGRESS NOTES
Colorectal Cancer Screening Gap Report  Chart reviewed, immunization record updated.  No new results noted on [facility] or [facility] web site.  Care Everywhere updated.   Patient care coordination note  LOV with PCP 2/21/2024.  Discussed Colorectal Cancer Screening options with patient, patient declined all Colorectal Cancer Screening options.  Patient declined to schedule eAWV.

## 2024-07-11 ENCOUNTER — PATIENT OUTREACH (OUTPATIENT)
Dept: ADMINISTRATIVE | Facility: HOSPITAL | Age: 74
End: 2024-07-11
Payer: MEDICARE

## 2024-07-11 NOTE — PROGRESS NOTES
[facility] Gap List, Colorectal Cancer Screening.  Chart reviewed, immunization record updated.  No new results noted on [facility] or [facility] web site.  Care Everywhere updated.   Patient care coordination note  LOV with PCP 2/21/2024.  Patient declined all Colorectal Cancer Screenings.  Scheduled PCP visit for 9/4/2024.  Patient states she will schedule DEXA and MMG.

## 2024-07-22 ENCOUNTER — TELEPHONE (OUTPATIENT)
Dept: FAMILY MEDICINE | Facility: CLINIC | Age: 74
End: 2024-07-22
Payer: MEDICARE

## 2024-07-22 NOTE — TELEPHONE ENCOUNTER
----- Message from Yrn Melendez sent at 2024  9:26 AM CDT -----  Contact: self  Raffi Amaro  MRN: [id number]  : 1950  PCP: Yahaira Bryson  Home Phone      [phone]  Work Phone      Not on file.  Mobile          [phone]      MESSAGE:   Patient states [facility] does not do bone density follow ups. Please advise    [phone]

## 2024-07-22 NOTE — TELEPHONE ENCOUNTER
Patient scheduled for DEXA at [facility] and accepted appointment date and time.

## 2024-07-24 ENCOUNTER — TELEPHONE (OUTPATIENT)
Dept: FAMILY MEDICINE | Facility: CLINIC | Age: 74
End: 2024-07-24
Payer: MEDICARE

## 2024-07-24 ENCOUNTER — HOSPITAL ENCOUNTER (OUTPATIENT)
Dept: RADIOLOGY | Facility: HOSPITAL | Age: 74
Discharge: HOME | End: 2024-07-24
Attending: FAMILY MEDICINE
Payer: MEDICARE

## 2024-07-24 ENCOUNTER — OFFICE VISIT (OUTPATIENT)
Dept: FAMILY MEDICINE | Facility: CLINIC | Age: 74
End: 2024-07-24
Payer: MEDICARE

## 2024-07-24 VITALS
RESPIRATION RATE: 16 BRPM | HEART RATE: 76 BPM | DIASTOLIC BLOOD PRESSURE: 96 MMHG | WEIGHT: 202.5 LBS | SYSTOLIC BLOOD PRESSURE: 158 MMHG | BODY MASS INDEX: 33.74 KG/M2 | HEIGHT: 65 IN

## 2024-07-24 DIAGNOSIS — G45.9 TRANSIENT CEREBRAL ISCHEMIA, UNSPECIFIED TYPE: ICD-10-CM

## 2024-07-24 DIAGNOSIS — R53.1 WEAKNESS: ICD-10-CM

## 2024-07-24 DIAGNOSIS — G45.9 TRANSIENT CEREBRAL ISCHEMIA, UNSPECIFIED TYPE: Primary | ICD-10-CM

## 2024-07-24 PROCEDURE — 3077F SYST BP >= 140 MM HG: CPT | Mod: HCNC,CPTII,S$GLB, | Performed by: FAMILY MEDICINE

## 2024-07-24 PROCEDURE — 3008F BODY MASS INDEX DOCD: CPT | Mod: HCNC,CPTII,S$GLB, | Performed by: FAMILY MEDICINE

## 2024-07-24 PROCEDURE — 99999 PR PBB SHADOW E&M-EST. PATIENT-LVL III: CPT | Mod: PBBFAC,HCNC,, | Performed by: FAMILY MEDICINE

## 2024-07-24 PROCEDURE — 1126F AMNT PAIN NOTED NONE PRSNT: CPT | Mod: HCNC,CPTII,S$GLB, | Performed by: FAMILY MEDICINE

## 2024-07-24 PROCEDURE — 3080F DIAST BP >= 90 MM HG: CPT | Mod: HCNC,CPTII,S$GLB, | Performed by: FAMILY MEDICINE

## 2024-07-24 PROCEDURE — 1159F MED LIST DOCD IN RCRD: CPT | Mod: HCNC,CPTII,S$GLB, | Performed by: FAMILY MEDICINE

## 2024-07-24 PROCEDURE — 1101F PT FALLS ASSESS-DOCD LE1/YR: CPT | Mod: HCNC,CPTII,S$GLB, | Performed by: FAMILY MEDICINE

## 2024-07-24 PROCEDURE — 70450 CT HEAD/BRAIN W/O DYE: CPT | Mod: 26,HCNC,, | Performed by: RADIOLOGY

## 2024-07-24 PROCEDURE — 99215 OFFICE O/P EST HI 40 MIN: CPT | Mod: HCNC,S$GLB,, | Performed by: FAMILY MEDICINE

## 2024-07-24 PROCEDURE — 70450 CT HEAD/BRAIN W/O DYE: CPT | Mod: TC,HCNC

## 2024-07-24 PROCEDURE — 3288F FALL RISK ASSESSMENT DOCD: CPT | Mod: HCNC,CPTII,S$GLB, | Performed by: FAMILY MEDICINE

## 2024-07-24 NOTE — TELEPHONE ENCOUNTER
----- Message from Ivan Joseph sent at 2024  8:22 AM CDT -----  Contact: Patient  Raffi Amaro  MRN: [id number]  : 1950  PCP: Yahaira Bryson  Home Phone      [phone]  Work Phone      Not on file.  Mobile          [phone]      MESSAGE: not feeling well -- requesting to speak with nurse    Call [phone]    PCP: [place]

## 2024-07-24 NOTE — TELEPHONE ENCOUNTER
Patient states that she was feeling weak last night. Ate ice cream and had diarrhea which is improved. No other symptoms. Advised to stay hydrated and rest. If symptoms reoccurred advised to call for appointment. Patient v/u

## 2024-07-24 NOTE — TELEPHONE ENCOUNTER
----- Message from Yrn Melendez sent at 2024 10:38 AM CDT -----  Contact: self  Raffi Amaro  MRN: [id number]  : 1950  PCP: Yahaira Bryson  Home Phone      [phone]  Work Phone      Not on file.  Mobile          [phone]      MESSAGE:   Patient is asking to speak with the nurse, about a infusion she is needing. Patient is feeling weak and can not drive. Please advise patient    [phone]

## 2024-07-24 NOTE — PROGRESS NOTES
Subjective:       Patient ID: Raffi Amaro is a 74 y.o. female.    Chief Complaint: slurred speach and Extremity Weakness (Daughter states patient called her last night and it sounded like her speech was slurred, and patient stated she was having trouble holding on to things. Daughter states by the time she got to patients home she was a lot better. Patient states she still doesn't feel 100 percent herself)    History of Present Illness  The patient presents for evaluation of multiple medical concerns. She is accompanied by her daughter.    The patient experienced a weird feeling last night at 8:20 p.m. characterized by slurred speech, weakness, and fatigue. She helped her daughter yesterday to go to her infusion, but then when she got home, she felt weak. She dropped her phone and then her purse. At 8:20 her daughter called to talk to her and she noticed that she sounded funny. Her daughter advised against her showering until she got there, though she had had diarrhea and was adamant about getting cleaned up. Upon arrival, her speech had returned to normal. She denies taking any new vitamins or energy pills. Despite maintaining adequate hydration, these things occurred.. She denies any leg weakness. She inspected herself in the mirror and ruled out a stroke. She did not monitor her blood pressure yesterday. She denies any hypoglycemic episodes. Her oxygen saturation was recorded at 98 percent.    PMH, PSH, ALLERGIES, SH, FH reviewed in nurse's notes above  Medications reconciled in the nurse's notes      Objective:      Physical Exam        Physical Exam  Vitals and nursing note reviewed.   Constitutional:       General: She is not in acute distress.     Appearance: She is well-developed.   HENT:      Head: Normocephalic and atraumatic.   Eyes:      Conjunctiva/sclera: Conjunctivae normal.      Pupils: Pupils are equal, round, and reactive to light.   Neck:      Thyroid: No thyromegaly.   Cardiovascular:       "Rate and Rhythm: Normal rate and regular rhythm.      Heart sounds: Normal heart sounds.   Pulmonary:      Effort: Pulmonary effort is normal. No respiratory distress.      Breath sounds: Normal breath sounds. No wheezing.   Abdominal:      General: Bowel sounds are normal.      Palpations: Abdomen is soft.      Tenderness: There is no abdominal tenderness.   Musculoskeletal:         General: Normal range of motion.      Cervical back: Normal range of motion and neck supple.   Lymphadenopathy:      Cervical: No cervical adenopathy.   Skin:     General: Skin is warm and dry.      Findings: No rash.   Neurological:      Mental Status: She is alert and oriented to person, place, and time.      Comments: Some intention tremor bilateral FNF   Psychiatric:         Behavior: Behavior normal.         Results    Assessment:           1. Transient cerebral ischemia, unspecified type    2. Weakness        Plan:     Assessment & Plan  1. Transient ischemic attack.  The patient's blood pressure is slightly elevated today. A CT scan of the brain and blood work will be ordered. An ultrasound of the heart will be performed. The patient has been advised to monitor her blood pressure for the next week, in the morning and in the afternoon before supper. Should the blood work results be normal, a heart monitor will be prescribed for a few days. She will f/u with me and neurology closely. MRI potential.    Raffi Childs" was seen today for slurred speach and extremity weakness.    Diagnoses and all orders for this visit:    Transient cerebral ischemia, unspecified type  -     CT Head Without Contrast; Future  -     CBC Auto Differential; Future  -     Comprehensive Metabolic Panel; Future  -     TSH; Future  -     Echo Saline Bubble? Yes; Future  -     US Carotid Bilateral; Future    Weakness  -     TSH; Future          RTC if condition acutely worsens or any other concerns, otherwise RTC as scheduled      This note was generated with the " assistance of ambient listening technology. Verbal consent was obtained by the patient and accompanying visitor(s) for the recording of patient appointment to facilitate this note. I attest to having reviewed and edited the generated note for accuracy, though some syntax or spelling errors may persist. Please contact the author of this note for any clarification.

## 2024-07-29 ENCOUNTER — HOSPITAL ENCOUNTER (OUTPATIENT)
Dept: RADIOLOGY | Facility: HOSPITAL | Age: 74
Discharge: HOME | End: 2024-07-29
Attending: FAMILY MEDICINE
Payer: MEDICARE

## 2024-07-29 PROCEDURE — 77080 DXA BONE DENSITY AXIAL: CPT | Mod: TC,HCNC

## 2024-07-29 PROCEDURE — 77080 DXA BONE DENSITY AXIAL: CPT | Mod: 26,HCNC,ICN, | Performed by: RADIOLOGY

## 2024-07-30 ENCOUNTER — TELEPHONE (OUTPATIENT)
Dept: FAMILY MEDICINE | Facility: CLINIC | Age: 74
End: 2024-07-30

## 2024-07-30 ENCOUNTER — OFFICE VISIT (OUTPATIENT)
Dept: FAMILY MEDICINE | Facility: CLINIC | Age: 74
End: 2024-07-30
Attending: FAMILY MEDICINE
Payer: MEDICARE

## 2024-07-30 ENCOUNTER — LAB VISIT (OUTPATIENT)
Dept: FAMILY MEDICINE | Facility: CLINIC | Age: 74
End: 2024-07-30
Payer: MEDICARE

## 2024-07-30 ENCOUNTER — HOSPITAL ENCOUNTER (OUTPATIENT)
Dept: RADIOLOGY | Facility: HOSPITAL | Age: 74
Discharge: HOME | End: 2024-07-30
Attending: FAMILY MEDICINE
Payer: MEDICARE

## 2024-07-30 ENCOUNTER — HOSPITAL ENCOUNTER (OUTPATIENT)
Dept: PULMONOLOGY | Facility: HOSPITAL | Age: 74
Discharge: HOME | End: 2024-07-30
Attending: FAMILY MEDICINE
Payer: MEDICARE

## 2024-07-30 VITALS
RESPIRATION RATE: 16 BRPM | HEART RATE: 72 BPM | DIASTOLIC BLOOD PRESSURE: 78 MMHG | BODY MASS INDEX: 33.24 KG/M2 | SYSTOLIC BLOOD PRESSURE: 146 MMHG | HEIGHT: 65 IN | WEIGHT: 199.5 LBS

## 2024-07-30 DIAGNOSIS — G45.9 TRANSIENT CEREBRAL ISCHEMIA, UNSPECIFIED TYPE: ICD-10-CM

## 2024-07-30 DIAGNOSIS — H92.01 ACUTE OTALGIA, RIGHT: ICD-10-CM

## 2024-07-30 DIAGNOSIS — H92.01 ACUTE OTALGIA, RIGHT: Primary | ICD-10-CM

## 2024-07-30 DIAGNOSIS — I10 ESSENTIAL HYPERTENSION: ICD-10-CM

## 2024-07-30 LAB
ALBUMIN/CREAT UR: 8.8 UG/MG (ref 0–30)
ANION GAP SERPL CALC-SCNC: 9 MMOL/L (ref 8–16)
ASCENDING AORTA: 2.95 CM
AV INDEX (PROSTH): 0.96
AV MEAN GRADIENT: 1 MMHG
AV PEAK GRADIENT: 3 MMHG
AV VALVE AREA BY VELOCITY RATIO: 3.61 CM²
AV VALVE AREA: 3.76 CM²
AV VELOCITY RATIO: 0.93
BUN SERPL-MCNC: 13 MG/DL (ref 8–23)
CALCIUM SERPL-MCNC: 9.7 MG/DL (ref 8.7–10.5)
CHLORIDE SERPL-SCNC: 106 MMOL/L (ref 95–110)
CO2 SERPL-SCNC: 26 MMOL/L (ref 23–29)
CREAT SERPL-MCNC: 1 MG/DL (ref 0.5–1.4)
CREAT UR-MCNC: 34.1 MG/DL (ref 15–325)
CV ECHO LV RWT: 0.49 CM
DOP CALC AO PEAK VEL: 0.81 M/S
DOP CALC AO VTI: 15.9 CM
DOP CALC LVOT AREA: 3.9 CM2
DOP CALC LVOT DIAMETER: 2.23 CM
DOP CALC LVOT PEAK VEL: 0.75 M/S
DOP CALC LVOT STROKE VOLUME: 59.73 CM3
DOP CALC RVOT PEAK VEL: 0.73 M/S
DOP CALC RVOT VTI: 16.6 CM
DOP CALCLVOT PEAK VEL VTI: 15.3 CM
E WAVE DECELERATION TIME: 200.94 MSEC
E/A RATIO: 1.46
E/E' RATIO: 8 M/S
ECHO LV POSTERIOR WALL: 1.19 CM (ref 0.6–1.1)
EST. GFR  (NO RACE VARIABLE): 59 ML/MIN/1.73 M^2
FRACTIONAL SHORTENING: 17 % (ref 28–44)
GLUCOSE SERPL-MCNC: 93 MG/DL (ref 70–110)
INTERVENTRICULAR SEPTUM: 1.22 CM (ref 0.6–1.1)
IVC DIAMETER: 1.63 CM
IVRT: 159.85 MSEC
LA MAJOR: 5.67 CM
LA MINOR: 5.84 CM
LA WIDTH: 4.1 CM
LEFT ATRIUM AREA SYSTOLIC (APICAL 2 CHAMBER): 21.96 CM2
LEFT ATRIUM AREA SYSTOLIC (APICAL 4 CHAMBER): 24.97 CM2
LEFT ATRIUM SIZE: 4.59 CM
LEFT ATRIUM VOLUME MOD: 70.2 CM3
LEFT ATRIUM VOLUME: 92.04 CM3
LEFT INTERNAL DIMENSION IN SYSTOLE: 4.03 CM (ref 2.1–4)
LEFT VENTRICLE DIASTOLIC VOLUME: 109.87 ML
LEFT VENTRICLE END SYSTOLIC VOLUME APICAL 2 CHAMBER: 61.97 ML
LEFT VENTRICLE END SYSTOLIC VOLUME APICAL 4 CHAMBER: 75.44 ML
LEFT VENTRICLE SYSTOLIC VOLUME: 71.31 ML
LEFT VENTRICULAR INTERNAL DIMENSION IN DIASTOLE: 4.84 CM (ref 3.5–6)
LEFT VENTRICULAR MASS: 223.32 G
LV LATERAL E/E' RATIO: 8.57 M/S
LV SEPTAL E/E' RATIO: 7.5 M/S
LVED V (TEICH): 109.87 ML
LVES V (TEICH): 71.31 ML
LVOT MG: 1.34 MMHG
LVOT MV: 0.55 CM/S
MICROALBUMIN UR DL<=1MG/L-MCNC: 3 UG/ML
MV PEAK A VEL: 0.41 M/S
MV PEAK E VEL: 0.6 M/S
MV STENOSIS PRESSURE HALF TIME: 58.27 MS
MV VALVE AREA P 1/2 METHOD: 3.78 CM2
OHS CV RV/LV RATIO: 0.71 CM
PISA TR MAX VEL: 3 M/S
POTASSIUM SERPL-SCNC: 4.3 MMOL/L (ref 3.5–5.1)
PULM VEIN S/D RATIO: 1.27
PV MEAN GRADIENT: 1 MMHG
PV MV: 0.44 M/S
PV PEAK D VEL: 0.56 M/S
PV PEAK GRADIENT: 2 MMHG
PV PEAK S VEL: 0.71 M/S
PV PEAK VELOCITY: 0.76 M/S
RA MAJOR: 6.1 CM
RA PRESSURE ESTIMATED: 3 MMHG
RIGHT VENTRICULAR END-DIASTOLIC DIMENSION: 3.45 CM
RV TB RVSP: 6 MMHG
SINUS: 3.63 CM
SODIUM SERPL-SCNC: 141 MMOL/L (ref 136–145)
STJ: 3 CM
TDI LATERAL: 0.07 M/S
TDI SEPTAL: 0.08 M/S
TDI: 0.08 M/S
TR MAX PG: 36 MMHG
TV REST PULMONARY ARTERY PRESSURE: 39 MMHG

## 2024-07-30 PROCEDURE — 93880 EXTRACRANIAL BILAT STUDY: CPT | Mod: 26,HCNC,, | Performed by: RADIOLOGY

## 2024-07-30 PROCEDURE — 3077F SYST BP >= 140 MM HG: CPT | Mod: HCNC,CPTII,S$GLB, | Performed by: FAMILY MEDICINE

## 2024-07-30 PROCEDURE — 36415 COLL VENOUS BLD VENIPUNCTURE: CPT | Mod: HCNC,S$GLB,, | Performed by: FAMILY MEDICINE

## 2024-07-30 PROCEDURE — 1159F MED LIST DOCD IN RCRD: CPT | Mod: HCNC,CPTII,S$GLB, | Performed by: FAMILY MEDICINE

## 2024-07-30 PROCEDURE — 3288F FALL RISK ASSESSMENT DOCD: CPT | Mod: HCNC,CPTII,S$GLB, | Performed by: FAMILY MEDICINE

## 2024-07-30 PROCEDURE — 80048 BASIC METABOLIC PNL TOTAL CA: CPT | Mod: HCNC | Performed by: FAMILY MEDICINE

## 2024-07-30 PROCEDURE — 99214 OFFICE O/P EST MOD 30 MIN: CPT | Mod: HCNC,S$GLB,, | Performed by: FAMILY MEDICINE

## 2024-07-30 PROCEDURE — 3008F BODY MASS INDEX DOCD: CPT | Mod: HCNC,CPTII,S$GLB, | Performed by: FAMILY MEDICINE

## 2024-07-30 PROCEDURE — 82570 ASSAY OF URINE CREATININE: CPT | Mod: HCNC | Performed by: FAMILY MEDICINE

## 2024-07-30 PROCEDURE — 3061F NEG MICROALBUMINURIA REV: CPT | Mod: HCNC,CPTII,S$GLB, | Performed by: FAMILY MEDICINE

## 2024-07-30 PROCEDURE — 1101F PT FALLS ASSESS-DOCD LE1/YR: CPT | Mod: HCNC,CPTII,S$GLB, | Performed by: FAMILY MEDICINE

## 2024-07-30 PROCEDURE — 93880 EXTRACRANIAL BILAT STUDY: CPT | Mod: TC,HCNC

## 2024-07-30 PROCEDURE — 82043 UR ALBUMIN QUANTITATIVE: CPT | Mod: HCNC | Performed by: FAMILY MEDICINE

## 2024-07-30 PROCEDURE — 93306 TTE W/DOPPLER COMPLETE: CPT | Mod: HCNC

## 2024-07-30 PROCEDURE — 93306 TTE W/DOPPLER COMPLETE: CPT | Mod: 26,HCNC,, | Performed by: INTERNAL MEDICINE

## 2024-07-30 PROCEDURE — 3066F NEPHROPATHY DOC TX: CPT | Mod: HCNC,CPTII,S$GLB, | Performed by: FAMILY MEDICINE

## 2024-07-30 PROCEDURE — 3078F DIAST BP <80 MM HG: CPT | Mod: HCNC,CPTII,S$GLB, | Performed by: FAMILY MEDICINE

## 2024-07-30 PROCEDURE — 99999 PR PBB SHADOW E&M-EST. PATIENT-LVL III: CPT | Mod: PBBFAC,HCNC,, | Performed by: FAMILY MEDICINE

## 2024-07-30 PROCEDURE — 1126F AMNT PAIN NOTED NONE PRSNT: CPT | Mod: HCNC,CPTII,S$GLB, | Performed by: FAMILY MEDICINE

## 2024-07-30 RX ORDER — OFLOXACIN 3 MG/ML
2 SOLUTION/ DROPS OPHTHALMIC 3 TIMES DAILY
Qty: 5 ML | Refills: 0 | Status: SHIPPED | OUTPATIENT
Start: 2024-07-30

## 2024-07-30 RX ORDER — CALCIUM CARBONATE 500(1250)
1 TABLET ORAL DAILY
COMMUNITY

## 2024-07-30 RX ORDER — CIPROFLOXACIN AND DEXAMETHASONE 3; 1 MG/ML; MG/ML
4 SUSPENSION/ DROPS AURICULAR (OTIC) 2 TIMES DAILY
Qty: 7.5 ML | Refills: 0 | Status: SHIPPED | OUTPATIENT
Start: 2024-07-30 | End: 2024-07-30

## 2024-07-30 RX ORDER — FLUTICASONE PROPIONATE 50 MCG
1 SPRAY, SUSPENSION (ML) NASAL DAILY
Qty: 16 G | Refills: 0 | Status: SHIPPED | OUTPATIENT
Start: 2024-07-30 | End: 2024-08-29

## 2024-07-30 RX ORDER — AMLODIPINE BESYLATE 5 MG/1
5 TABLET ORAL DAILY
Qty: 90 TABLET | Refills: 3 | Status: SHIPPED | OUTPATIENT
Start: 2024-07-30 | End: 2025-07-30

## 2024-07-30 NOTE — PROGRESS NOTES
Subjective:       Patient ID: Raffi Aamro is a 74 y.o. female.    Chief Complaint: Follow-up (1 week follow up)    HPI  74 year old female comes in for f/u after concern for tia. She has been watching bp - 140-150s/80-90s. She says that she has been feeling better but her daughter reports that today she finally seems back to herself.    PMH, PSH, ALLERGIES, SH, FH reviewed in nurse's notes above  Medications reconciled in the nurse's notes      Review of Systems   Constitutional:  Negative for chills and fever.   HENT:  Negative for congestion, ear pain, postnasal drip, rhinorrhea, sore throat and trouble swallowing.    Eyes:  Negative for redness and itching.   Respiratory:  Negative for cough, shortness of breath and wheezing.    Cardiovascular:  Negative for chest pain and palpitations.   Gastrointestinal:  Negative for abdominal pain, diarrhea, nausea and vomiting.   Genitourinary:  Negative for dysuria and frequency.   Skin:  Negative for rash.   Neurological:  Negative for weakness and headaches.       Objective:      Physical Exam  Vitals and nursing note reviewed.   Constitutional:       General: She is not in acute distress.     Appearance: She is well-developed.   HENT:      Head: Normocephalic and atraumatic.      Ears:      Comments: Fluid effusion right ear  Eyes:      Conjunctiva/sclera: Conjunctivae normal.      Pupils: Pupils are equal, round, and reactive to light.   Neck:      Thyroid: No thyromegaly.   Cardiovascular:      Rate and Rhythm: Normal rate and regular rhythm.      Heart sounds: Normal heart sounds.   Pulmonary:      Effort: Pulmonary effort is normal. No respiratory distress.      Breath sounds: Normal breath sounds. No wheezing.   Abdominal:      General: Bowel sounds are normal.      Palpations: Abdomen is soft.      Tenderness: There is no abdominal tenderness.   Musculoskeletal:         General: Normal range of motion.      Cervical back: Normal range of motion and neck supple.    Lymphadenopathy:      Cervical: No cervical adenopathy.   Skin:     General: Skin is warm and dry.      Findings: No rash.   Neurological:      Mental Status: She is alert and oriented to person, place, and time.   Psychiatric:         Behavior: Behavior normal.          Assessment/Plan:       Problem List Items Addressed This Visit    None  Visit Diagnoses       Acute otalgia, right    -  Primary    Relevant Medications    fluticasone propionate (FLONASE) 50 mcg/actuation nasal spray    ciprofloxacin-dexAMETHasone 0.3-0.1% (CIPRODEX) 0.3-0.1 % DrpS    Transient cerebral ischemia, unspecified type        Relevant Orders    Cardiac event monitor    Essential hypertension        Relevant Medications    amLODIPine (NORVASC) 5 MG tablet    Other Relevant Orders    Basic Metabolic Panel (Completed)    Microalbumin/Creatinine Ratio, Urine (Completed)        Bp noted to be elevated over last week.  Reviewed risk factors for tia. Ct okay but showing signs of chronic ischemic change.   Reviewed this with patient and daughter.    Will check holter.    Will monitor bp. Labs ordered.    C/o pain in right ear. Apparently had side effects with nose spray in past and is declining treatment with nasal steroid. Discussed ent eval.    RTC if condition acutely worsens or any other concerns, otherwise RTC as scheduled

## 2024-07-30 NOTE — TELEPHONE ENCOUNTER
Pharmacy is requesting a prescription change to NEOMYCIN-POLYMYXIN-HC,OFLOXACIN     Please advise.

## 2024-07-30 NOTE — TELEPHONE ENCOUNTER
[facility] called stating that ear drops prescribed are not covered by patient's insurance and will cost 100 dollars out of pocket but ofloxacin is covered. They will change to the covered medication.

## 2024-07-31 NOTE — PROGRESS NOTES
Kidney function is about the same - but no leaking of protein. Will go ahead with amlodipine for bp as we discussed.  mh

## 2024-08-06 ENCOUNTER — TELEPHONE (OUTPATIENT)
Dept: FAMILY MEDICINE | Facility: CLINIC | Age: 74
End: 2024-08-06
Payer: MEDICARE

## 2024-08-07 ENCOUNTER — TELEPHONE (OUTPATIENT)
Dept: INTERNAL MEDICINE | Facility: CLINIC | Age: 74
End: 2024-08-07
Payer: MEDICARE

## 2024-08-07 ENCOUNTER — CLINICAL SUPPORT (OUTPATIENT)
Dept: CARDIOLOGY | Facility: HOSPITAL | Age: 74
End: 2024-08-07
Attending: FAMILY MEDICINE
Payer: MEDICARE

## 2024-08-07 DIAGNOSIS — G45.9 TRANSIENT CEREBRAL ISCHEMIA, UNSPECIFIED TYPE: ICD-10-CM

## 2024-08-07 PROCEDURE — 93271 ECG/MONITORING AND ANALYSIS: CPT | Mod: HCNC

## 2024-08-13 ENCOUNTER — TELEPHONE (OUTPATIENT)
Dept: FAMILY MEDICINE | Facility: CLINIC | Age: 74
End: 2024-08-13
Payer: MEDICARE

## 2024-08-13 NOTE — TELEPHONE ENCOUNTER
"Patient calling to report recent bp readings 8/11  am 90/70 pm 111/70. 8/12 am 127/77 pm 114/69. Patient is concerned that number may be "too low" to take medication and wanted to let you know she is continuing to monitor. Please advise with any instructions or changes.   "

## 2024-09-03 ENCOUNTER — PATIENT OUTREACH (OUTPATIENT)
Dept: ADMINISTRATIVE | Facility: HOSPITAL | Age: 74
End: 2024-09-03
Payer: MEDICARE

## 2024-09-03 DIAGNOSIS — Z12.31 BREAST CANCER SCREENING BY MAMMOGRAM: Primary | ICD-10-CM

## 2024-09-03 NOTE — PROGRESS NOTES
Colorectal Cancer Screening Gap Report  Chart reviewed, immunization record updated.  No new results noted on [facility] or [employer] web site.  Care Everywhere updated.   Patient care coordination note  Upcoming PCP visit updated.  Next PCP visit 9/4/2024.  MMG order placed.  Left detailed message for patient to return call to discuss Colorectal Cancer Screening and schedule MMG.

## 2024-09-04 ENCOUNTER — OFFICE VISIT (OUTPATIENT)
Dept: FAMILY MEDICINE | Facility: CLINIC | Age: 74
End: 2024-09-04
Payer: MEDICARE

## 2024-09-04 VITALS
BODY MASS INDEX: 34.22 KG/M2 | HEART RATE: 72 BPM | WEIGHT: 205.38 LBS | HEIGHT: 65 IN | DIASTOLIC BLOOD PRESSURE: 88 MMHG | RESPIRATION RATE: 16 BRPM | SYSTOLIC BLOOD PRESSURE: 142 MMHG

## 2024-09-04 DIAGNOSIS — N18.31 CHRONIC KIDNEY DISEASE, STAGE 3A: Primary | ICD-10-CM

## 2024-09-04 DIAGNOSIS — H92.01 ACUTE OTALGIA, RIGHT: ICD-10-CM

## 2024-09-04 PROCEDURE — 1126F AMNT PAIN NOTED NONE PRSNT: CPT | Mod: HCNC,CPTII,S$GLB, | Performed by: FAMILY MEDICINE

## 2024-09-04 PROCEDURE — 3077F SYST BP >= 140 MM HG: CPT | Mod: HCNC,CPTII,S$GLB, | Performed by: FAMILY MEDICINE

## 2024-09-04 PROCEDURE — 1101F PT FALLS ASSESS-DOCD LE1/YR: CPT | Mod: HCNC,CPTII,S$GLB, | Performed by: FAMILY MEDICINE

## 2024-09-04 PROCEDURE — 3288F FALL RISK ASSESSMENT DOCD: CPT | Mod: HCNC,CPTII,S$GLB, | Performed by: FAMILY MEDICINE

## 2024-09-04 PROCEDURE — 1159F MED LIST DOCD IN RCRD: CPT | Mod: HCNC,CPTII,S$GLB, | Performed by: FAMILY MEDICINE

## 2024-09-04 PROCEDURE — 3066F NEPHROPATHY DOC TX: CPT | Mod: HCNC,CPTII,S$GLB, | Performed by: FAMILY MEDICINE

## 2024-09-04 PROCEDURE — 3008F BODY MASS INDEX DOCD: CPT | Mod: HCNC,CPTII,S$GLB, | Performed by: FAMILY MEDICINE

## 2024-09-04 PROCEDURE — 99213 OFFICE O/P EST LOW 20 MIN: CPT | Mod: HCNC,S$GLB,, | Performed by: FAMILY MEDICINE

## 2024-09-04 PROCEDURE — 4010F ACE/ARB THERAPY RXD/TAKEN: CPT | Mod: HCNC,CPTII,S$GLB, | Performed by: FAMILY MEDICINE

## 2024-09-04 PROCEDURE — 99999 PR PBB SHADOW E&M-EST. PATIENT-LVL III: CPT | Mod: PBBFAC,HCNC,, | Performed by: FAMILY MEDICINE

## 2024-09-04 PROCEDURE — 3061F NEG MICROALBUMINURIA REV: CPT | Mod: HCNC,CPTII,S$GLB, | Performed by: FAMILY MEDICINE

## 2024-09-04 PROCEDURE — 3079F DIAST BP 80-89 MM HG: CPT | Mod: HCNC,CPTII,S$GLB, | Performed by: FAMILY MEDICINE

## 2024-09-04 RX ORDER — OFLOXACIN 3 MG/ML
2 SOLUTION/ DROPS OPHTHALMIC 3 TIMES DAILY
Qty: 5 ML | Refills: 0 | Status: SHIPPED | OUTPATIENT
Start: 2024-09-04

## 2024-09-04 RX ORDER — LISINOPRIL 10 MG/1
10 TABLET ORAL DAILY
Qty: 30 TABLET | Refills: 2 | Status: SHIPPED | OUTPATIENT
Start: 2024-09-04 | End: 2025-09-04

## 2024-09-04 RX ORDER — COVID-19 ANTIGEN TEST
KIT MISCELLANEOUS
Qty: 5 EACH | Refills: 0 | Status: SHIPPED | OUTPATIENT
Start: 2024-09-04

## 2024-09-04 NOTE — PROGRESS NOTES
Subjective:       Patient ID: Raffi Amaro is a 74 y.o. female.    Chief Complaint: Follow-up (1 month follow up)    HPI  ***    Review of Systems    Objective:      Physical Exam     Assessment/Plan:       Problem List Items Addressed This Visit          Renal/    Chronic kidney disease, stage 3a - Primary     Other Visit Diagnoses       Acute otalgia, right        Relevant Medications    ofloxacin (OCUFLOX) 0.3 % ophthalmic solution            ***

## 2024-09-04 NOTE — PROGRESS NOTES
Subjective:       Patient ID: Raffi Amaro is a 74 y.o. female.    Chief Complaint: Follow-up (1 month follow up)    History of Present Illness  The patient presents for evaluation of multiple medical concerns.    She discontinued amlodipine due to adverse effects, including feelings of fatigue and impending faintness. She also experienced body-wide numbness, which she attributes to the medication. After stopping amlodipine, her symptoms improved. However, she has been informed that her systolic blood pressure is elevated. She recalls an incident where she felt faint while mowing the lawn, leading to vomiting and a recorded blood pressure of less than 120-30. She believes this episode was due to dehydration. Her blood pressure readings are inconsistent, sometimes normal and sometimes high, particularly at night.    Objective:      Physical Exam        Physical Exam  Vitals and nursing note reviewed.   Constitutional:       General: She is not in acute distress.     Appearance: She is well-developed.   HENT:      Head: Normocephalic and atraumatic.   Eyes:      Conjunctiva/sclera: Conjunctivae normal.      Pupils: Pupils are equal, round, and reactive to light.   Neck:      Thyroid: No thyromegaly.   Cardiovascular:      Rate and Rhythm: Normal rate and regular rhythm.      Heart sounds: Normal heart sounds.   Pulmonary:      Effort: Pulmonary effort is normal. No respiratory distress.      Breath sounds: Normal breath sounds. No wheezing.   Abdominal:      General: Bowel sounds are normal.      Palpations: Abdomen is soft.      Tenderness: There is no abdominal tenderness.   Musculoskeletal:         General: Normal range of motion.      Cervical back: Normal range of motion and neck supple.   Lymphadenopathy:      Cervical: No cervical adenopathy.   Skin:     General: Skin is warm and dry.      Findings: No rash.   Neurological:      Mental Status: She is alert and oriented to person, place, and time.  "  Psychiatric:         Behavior: Behavior normal.         Results    Assessment:           1. Chronic kidney disease, stage 3a    2. Acute otalgia, right        Plan:     Assessment & Plan  1. Hypertension.  Her blood pressure is elevated without medication, contributing to vascular issues in the brain. She previously experienced adverse effects with amlodipine, including systemic numbness and near syncope, leading to discontinuation. A prescription for lisinopril 10 mg has been issued as an alternative to manage her hypertension gradually. She is advised to monitor her blood pressure and ensure adequate hydration to prevent hypotension. If symptoms persist or worsen, further adjustments will be considered.    Will start on ace.    Raffi Childs" was seen today for follow-up.    Diagnoses and all orders for this visit:    Chronic kidney disease, stage 3a    Acute otalgia, right  -     ofloxacin (OCUFLOX) 0.3 % ophthalmic solution; Place 2 drops into the right eye 3 (three) times daily. Place into ear.    Other orders  -     lisinopriL 10 MG tablet; Take 1 tablet (10 mg total) by mouth once daily.  -     COVID-19 antigen test (COVID-19 AT-HOME TEST) Kit; Check as needed          RTC if condition acutely worsens or any other concerns, otherwise RTC as scheduled      This note was generated with the assistance of ambient listening technology. Verbal consent was obtained by the patient and accompanying visitor(s) for the recording of patient appointment to facilitate this note. I attest to having reviewed and edited the generated note for accuracy, though some syntax or spelling errors may persist. Please contact the author of this note for any clarification.    "

## 2024-09-24 DIAGNOSIS — I47.29 NONSUSTAINED VENTRICULAR TACHYCARDIA: Primary | ICD-10-CM

## 2024-09-24 NOTE — PROGRESS NOTES
Spoke to patient, advised her of Dr Romero recommendations. Was able to get her scheduled for Thursday like she requested with Dr Portillo. She stated understanding.

## 2024-09-26 ENCOUNTER — OFFICE VISIT (OUTPATIENT)
Dept: CARDIOLOGY | Facility: CLINIC | Age: 74
End: 2024-09-26
Payer: MEDICARE

## 2024-09-26 VITALS
BODY MASS INDEX: 33.59 KG/M2 | DIASTOLIC BLOOD PRESSURE: 92 MMHG | SYSTOLIC BLOOD PRESSURE: 154 MMHG | OXYGEN SATURATION: 98 % | HEIGHT: 65 IN | HEART RATE: 69 BPM | WEIGHT: 201.63 LBS

## 2024-09-26 DIAGNOSIS — I49.3 PVC (PREMATURE VENTRICULAR CONTRACTION): ICD-10-CM

## 2024-09-26 DIAGNOSIS — N18.31 CHRONIC KIDNEY DISEASE, STAGE 3A: ICD-10-CM

## 2024-09-26 DIAGNOSIS — I47.29 NONSUSTAINED VENTRICULAR TACHYCARDIA: Primary | ICD-10-CM

## 2024-09-26 DIAGNOSIS — I10 PRIMARY HYPERTENSION: ICD-10-CM

## 2024-09-26 DIAGNOSIS — R94.31 ABNORMAL ECG: ICD-10-CM

## 2024-09-26 DIAGNOSIS — G45.9 TRANSIENT CEREBRAL ISCHEMIA, UNSPECIFIED TYPE: ICD-10-CM

## 2024-09-26 DIAGNOSIS — E66.09 CLASS 1 OBESITY DUE TO EXCESS CALORIES WITHOUT SERIOUS COMORBIDITY WITH BODY MASS INDEX (BMI) OF 33.0 TO 33.9 IN ADULT: ICD-10-CM

## 2024-09-26 DIAGNOSIS — G45.9 TIA (TRANSIENT ISCHEMIC ATTACK): ICD-10-CM

## 2024-09-26 DIAGNOSIS — I47.20 VENTRICULAR TACHYCARDIA: ICD-10-CM

## 2024-09-26 PROBLEM — E66.9 CLASS 1 OBESITY IN ADULT: Status: ACTIVE | Noted: 2024-09-26

## 2024-09-26 PROBLEM — E66.811 CLASS 1 OBESITY IN ADULT: Status: ACTIVE | Noted: 2024-09-26

## 2024-09-26 PROCEDURE — 99999 PR PBB SHADOW E&M-EST. PATIENT-LVL IV: CPT | Mod: PBBFAC,HCNC,, | Performed by: INTERNAL MEDICINE

## 2024-09-26 NOTE — PROGRESS NOTES
[facility] Cardiology     Subjective:    Patient ID:  Raffi Amaro is a 74 y.o. female who presents for evaluation of Ventricular tachycardia, Transient Ischemic Attack, Hypertension, and Carotid Artery Disease    Review of patient's allergies indicates:   Allergen Reactions    Aspirin      She recently reported TIA like symptoms July 23, 2024.  She did not go to the emergency room.  She did go see Dr. Bryson in the next day.  Echo confirmed normal ejection fraction and wall motion without significant valvular disease.  Carotid ultrasound showed mild plaquing without obstructive disease.  A 30 day event monitor was ordered.  There was no atrial fibrillation.  A reported asymptomatic 6 beat run of ventricular tachycardia was documented.  I am unable to confirmed the time that it occurred.  She did not feel palpitations when she had the monitor on.    She has been asymptomatic neurologically.  Her blood pressure was significantly elevated when she saw Dr. Bryson.  She has no history of elevated blood pressures.  Lisinopril 10 mg was initiated.  Her readings have come down.  She chronically monitors her blood pressures at home and saw 130s before she started treatment.  Today's blood pressure at home was 129/77.  In my office she is 154/92 mm Hg.  She is compliant with lisinopril without side effects.    She does a lot of outdoor activity including cutting her grass.  She has never had a stress test but reports no symptoms of breathing problems or chest pain.  She has never had palpitations or lightheaded episodes.    She does report that the type of complaint she had in July described with TIA symptoms have occurred previously.  She has never seen a neurologist.  It sounds like most of the events have not been evaluated medically.    Today's electrocardiogram reviewed and independently interpreted by myself confirms heart rate 69 sinus rhythm.   Minimal nonspecific ST abnormality present.      Her LDL is 104 mg% she has never been on treatment.         Review of Systems   Constitutional: Negative for chills, decreased appetite, diaphoresis, fever, malaise/fatigue, night sweats, weight gain and weight loss.   HENT:  Negative for congestion, ear discharge, ear pain, hearing loss, hoarse voice, nosebleeds, odynophagia, sore throat, stridor and tinnitus.    Eyes:  Negative for blurred vision, discharge, double vision, pain, photophobia, redness, vision loss in left eye, vision loss in right eye, visual disturbance and visual halos.   Cardiovascular:  Negative for chest pain, claudication, cyanosis, dyspnea on exertion, irregular heartbeat, leg swelling, near-syncope, orthopnea, palpitations, paroxysmal nocturnal dyspnea and syncope.   Respiratory:  Negative for cough, hemoptysis, shortness of breath, sleep disturbances due to breathing, snoring, sputum production and wheezing.    Endocrine: Negative for cold intolerance, heat intolerance, polydipsia, polyphagia and polyuria.   Hematologic/Lymphatic: Negative for adenopathy and bleeding problem. Does not bruise/bleed easily.   Skin:  Negative for color change, dry skin, flushing, itching, nail changes, poor wound healing, rash, skin cancer, suspicious lesions and unusual hair distribution.   Musculoskeletal:  Negative for arthritis, back pain, falls, gout, joint pain, joint swelling, muscle cramps, muscle weakness, myalgias, neck pain and stiffness.   Gastrointestinal:  Negative for bloating, abdominal pain, anorexia, change in bowel habit, bowel incontinence, constipation, diarrhea, dysphagia, excessive appetite, flatus, heartburn, hematemesis, hematochezia, hemorrhoids, jaundice, melena, nausea and vomiting.   Genitourinary:  Negative for bladder incontinence, decreased libido, dysuria, flank pain, frequency, genital sores, hematuria, hesitancy, incomplete emptying, nocturia and urgency.   Neurological:   "Negative for aphonia, brief paralysis, difficulty with concentration, disturbances in coordination, excessive daytime sleepiness, dizziness, focal weakness, headaches, light-headedness, loss of balance, numbness, paresthesias, seizures, sensory change, tremors, vertigo and weakness.   Psychiatric/Behavioral:  Negative for altered mental status, depression, hallucinations, memory loss, substance abuse, suicidal ideas and thoughts of violence. The patient does not have insomnia and is not nervous/anxious.    Allergic/Immunologic: Negative for hives and persistent infections.        Objective:       Vitals:    09/26/24 1011   BP: (!) 154/92   Pulse: 69   SpO2: 98%   Weight: 91.4 kg (201 lb 9.8 oz)   Height: 5' 5" (1.651 m)    Physical Exam  Constitutional:       General: She is not in acute distress.     Appearance: She is well-developed. She is not diaphoretic.   HENT:      Head: Normocephalic and atraumatic.      Nose: Nose normal.   Eyes:      General: No scleral icterus.        Right eye: No discharge.      Conjunctiva/sclera: Conjunctivae normal.      Pupils: Pupils are equal, round, and reactive to light.   Neck:      Thyroid: No thyromegaly.      Vascular: No JVD.      Trachea: No tracheal deviation.   Cardiovascular:      Rate and Rhythm: Normal rate and regular rhythm.      Pulses:           Carotid pulses are 2+ on the right side and 2+ on the left side.       Radial pulses are 2+ on the right side and 2+ on the left side.        Dorsalis pedis pulses are 2+ on the right side and 2+ on the left side.        Posterior tibial pulses are 2+ on the right side and 2+ on the left side.      Heart sounds: Normal heart sounds. No murmur heard.     No friction rub. No gallop.   Pulmonary:      Effort: Pulmonary effort is normal. No respiratory distress.      Breath sounds: Normal breath sounds. No stridor. No wheezing or rales.   Chest:      Chest wall: No tenderness.   Abdominal:      General: Bowel sounds are " normal. There is no distension.      Palpations: Abdomen is soft. There is no mass.      Tenderness: There is no abdominal tenderness. There is no guarding or rebound.   Musculoskeletal:         General: No tenderness. Normal range of motion.      Cervical back: Normal range of motion and neck supple.   Lymphadenopathy:      Cervical: No cervical adenopathy.   Skin:     General: Skin is warm and dry.      Coloration: Skin is not pale.      Findings: No erythema or rash.   Neurological:      Mental Status: She is alert and oriented to person, place, and time.      Cranial Nerves: No cranial nerve deficit.      Coordination: Coordination normal.   Psychiatric:         Behavior: Behavior normal.         Thought Content: Thought content normal.         Judgment: Judgment normal.           Assessment:       1. Nonsustained ventricular tachycardia    2. Transient cerebral ischemia, unspecified type    3. PVC (premature ventricular contraction)    4. Abnormal ECG    5. Ventricular tachycardia    6. Chronic kidney disease, stage 3a    7. Primary hypertension    8. TIA (transient ischemic attack)    9. Class 1 obesity due to excess calories without serious comorbidity with body mass index (BMI) of 33.0 to 33.9 in adult      Results for orders placed or performed during the hospital encounter of 07/30/24   Echo Saline Bubble? Yes   Result Value Ref Range    LVOT stroke volume 59.73 cm3    LVIDd 4.84 3.5 - 6.0 cm    LV Systolic Volume 71.31 mL    LVIDs 4.03 (A) 2.1 - 4.0 cm    LV ESV A2C 61.97 mL    LV Diastolic Volume 109.87 mL    LV ESV A4C 75.44 mL    Left Ventricular End Systolic Volume by Teichholz Method 71.31 mL    Left Ventricular End Diastolic Volume by Teichholz Method 109.87 mL    IVS 1.22 (A) 0.6 - 1.1 cm    LVOT diameter 2.23 cm    LVOT area 3.9 cm2    FS 17 (A) 28 - 44 %    Left Ventricle Relative Wall Thickness 0.49 cm    Posterior Wall 1.19 (A) 0.6 - 1.1 cm    LV mass 223.32 g    MV Peak E Brandon 0.60 m/s    TDI  LATERAL 0.07 m/s    TDI SEPTAL 0.08 m/s    E/E' ratio 8.00 m/s    MV Peak A Brandon 0.41 m/s    TR Max Brandon 3.00 m/s    E/A ratio 1.46     IVRT 159.85 msec    E wave deceleration time 200.94 msec    LV SEPTAL E/E' RATIO 7.50 m/s    LV LATERAL E/E' RATIO 8.57 m/s    PV Peak S Brandon 0.71 m/s    PV Peak D Brandon 0.56 m/s    Pulm vein S/D ratio 1.27     LVOT peak brandon 0.75 m/s    Left Ventricular Outflow Tract Mean Velocity 0.55 cm/s    Left Ventricular Outflow Tract Mean Gradient 1.34 mmHg    RVOT peak VTI 16.6 cm    RV/LV Ratio 0.71 cm    LA size 4.59 cm    Left Atrium Minor Axis 5.84 cm    Left Atrium Major Axis 5.67 cm    LA Vol (MOD) 70.20 cm3    RA Major Axis 6.10 cm    AV mean gradient 1 mmHg    AV peak gradient 3 mmHg    Ao peak brandon 0.81 m/s    Ao VTI 15.90 cm    LVOT peak VTI 15.30 cm    AV valve area 3.76 cm²    AV Velocity Ratio 0.93     AV index (prosthetic) 0.96     RUI by Velocity Ratio 3.61 cm²    MV stenosis pressure 1/2 time 58.27 ms    MV valve area p 1/2 method 3.78 cm2    Triscuspid Valve Regurgitation Peak Gradient 36 mmHg    PV mean gradient 1 mmHg    PV PEAK VELOCITY 0.76 m/s    PV peak gradient 2 mmHg    Pulmonary Valve Mean Velocity 0.44 m/s    RVOT peak brandon 0.73 m/s    Sinus 3.63 cm    STJ 3.00 cm    Ascending aorta 2.95 cm    IVC diameter 1.63 cm    Mean e' 0.08 m/s    LA area A4C 24.97 cm2    LA area A2C 21.96 cm2    RVDD 3.45 cm    LA volume 92.04 cm3    LA WIDTH 4.1 cm    TV resting pulmonary artery pressure 39 mmHg    RV TB RVSP 6 mmHg    Est. RA pres 3 mmHg         Current Outpatient Medications:     acetaminophen (TYLENOL) 500 MG tablet, , Disp: , Rfl:     b complex vitamins tablet, Take 1 tablet by mouth once daily., Disp: , Rfl:     calcium carbonate (OS-HUBER) 500 mg calcium (1,250 mg) tablet, Take 1 tablet by mouth once daily., Disp: , Rfl:     COVID-19 antigen test (COVID-19 AT-HOME TEST) Kit, Check as needed, Disp: 5 each, Rfl: 0    ferrous sulfate (FEOSOL) 325 mg (65 mg iron) Tab tablet, Take  "325 mg by mouth daily with breakfast., Disp: , Rfl:     lisinopriL 10 MG tablet, Take 1 tablet (10 mg total) by mouth once daily., Disp: 30 tablet, Rfl: 2    multivitamin-Ca-iron-minerals 27-0.4 mg Tab, Take 1 tablet by mouth once daily. , Disp: , Rfl:     ofloxacin (OCUFLOX) 0.3 % ophthalmic solution, Place 2 drops into the right eye 3 (three) times daily. Place into ear., Disp: 5 mL, Rfl: 0    vitamin D (VITAMIN D3) 1000 units Tab, Take 1,000 Units by mouth once daily., Disp: , Rfl:      Lab Results   Component Value Date    WBC 7.17 07/24/2024    RBC 4.94 07/24/2024    HGB 14.1 07/24/2024    HCT 43.6 07/24/2024    MCV 88 07/24/2024    MCH 28.5 07/24/2024    MCHC 32.3 07/24/2024    RDW 12.9 07/24/2024     07/24/2024    MPV 10.1 07/24/2024    GRAN 4.6 07/24/2024    GRAN 64.4 07/24/2024    LYMPH 1.7 07/24/2024    LYMPH 23.8 07/24/2024    MONO 0.6 07/24/2024    MONO 8.4 07/24/2024    EOS 0.2 07/24/2024    BASO 0.05 07/24/2024    EOSINOPHIL 2.4 07/24/2024    BASOPHIL 0.7 07/24/2024        CMP  Lab Results   Component Value Date     07/30/2024    K 4.3 07/30/2024     07/30/2024    CO2 26 07/30/2024    GLU 93 07/30/2024    BUN 13 07/30/2024    CREATININE 1.0 07/30/2024    CALCIUM 9.7 07/30/2024    PROT 7.3 07/24/2024    ALBUMIN 4.0 07/24/2024    BILITOT 1.0 07/24/2024    ALKPHOS 88 07/24/2024    AST 24 07/24/2024    ALT 19 07/24/2024    ANIONGAP 9 07/30/2024    ESTGFRAFRICA >60 05/25/2022    EGFRNONAA >60 05/25/2022        No results found for: "LABBLOO", "LABURIN", "RESPIRATORYC", "GSRESP"         No results found for this or any previous visit.     Cardiac event monitor 08/07/2024 [id number] Final   Echo Saline Bubble? Yes 07/30/2024 [id number] Final   US Carotid Bilateral 07/30/2024 [id number] Final            Plan:       Problem List Items Addressed This Visit          Neuro    TIA (transient ischemic attack)     Recurrent symptomatology consistent with TIA.  No recurrence since July 23rd 2024.  " Bilateral symptoms reported with arm weakness making acute vascular brain injury less likely.    Her echo showed normal ejection fraction.  Carotid disease mild-nonobstructive with minimal plaquing identified.    .  She is not interested in statin therapy.            Cardiac/Vascular    Ventricular tachycardia     A 6 beat run of asymptomatic ventricular tachycardia recorded on 30 day event monitor recently.    The event monitor was ordered for evaluation of AFib related to TIA.  She has no awareness of palpitations.         Primary hypertension     Lisinopril 10 mg recently initiated at time of TIA symptomatology in July.  Her blood pressure was significantly elevated on that day.  She documents that she has been in the 130s chronically.    Higher doses of lisinopril may be required.  Her echo did not show LVH.            Renal/    Chronic kidney disease, stage 3a     Condition unchanged.            Endocrine    Class 1 obesity in adult     Weight loss recommended.          Other Visit Diagnoses       Nonsustained ventricular tachycardia    -  Primary    Relevant Orders    Exercise Stress - EKG    Transient cerebral ischemia, unspecified type        Relevant Orders    Exercise Stress - EKG    IN OFFICE EKG 12-LEAD (to Muse)    PVC (premature ventricular contraction)        Relevant Orders    Exercise Stress - EKG    Abnormal ECG        Relevant Orders    Exercise Stress - EKG               We discussed findings of 30 day event monitor.  Stress test ordered.  She may need additional therapy for hypertension.      I told her she could see me on an as needed basis.    Provided her stress test is negative, I would not recommend treatment for her run of VT noted on 30 day event monitor, asymptomatic.  This is based on her echo findings and asymptomatic status.    Thank you for allowing me to participate in your patient's care.              Scotty Garcia MD  09/26/2024   11:05 AM

## 2024-09-26 NOTE — ASSESSMENT & PLAN NOTE
A 6 beat run of asymptomatic ventricular tachycardia recorded on 30 day event monitor recently.    The event monitor was ordered for evaluation of AFib related to TIA.  She has no awareness of palpitations.

## 2024-09-26 NOTE — ASSESSMENT & PLAN NOTE
Recurrent symptomatology consistent with TIA.  No recurrence since July 23rd 2024.  Bilateral symptoms reported with arm weakness making acute vascular brain injury less likely.    Her echo showed normal ejection fraction.  Carotid disease mild-nonobstructive with minimal plaquing identified.    .  She is not interested in statin therapy.

## 2024-09-26 NOTE — ASSESSMENT & PLAN NOTE
Lisinopril 10 mg recently initiated at time of TIA symptomatology in July.  Her blood pressure was significantly elevated on that day.  She documents that she has been in the 130s chronically.    Higher doses of lisinopril may be required.  Her echo did not show LVH.

## 2024-09-27 LAB
OHS QRS DURATION: 82 MS
OHS QTC CALCULATION: 413 MS

## 2024-09-30 ENCOUNTER — PATIENT OUTREACH (OUTPATIENT)
Dept: ADMINISTRATIVE | Facility: HOSPITAL | Age: 74
End: 2024-09-30
Payer: MEDICARE

## 2024-09-30 ENCOUNTER — TELEPHONE (OUTPATIENT)
Dept: FAMILY MEDICINE | Facility: CLINIC | Age: 74
End: 2024-09-30
Payer: MEDICARE

## 2024-09-30 VITALS — DIASTOLIC BLOOD PRESSURE: 82 MMHG | SYSTOLIC BLOOD PRESSURE: 134 MMHG

## 2024-09-30 NOTE — TELEPHONE ENCOUNTER
----- Message from Med Assistant Leo sent at 9/30/2024  1:43 PM CDT -----  Regarding: Medication question  Patient wants to know if Dr. Bryson wants her to refill her blood pressure medication. She has 2 refills for it, but wants to ask before she gets it refilled. Her call back number is  [phone].

## 2024-09-30 NOTE — TELEPHONE ENCOUNTER
Spoke to patient, advised her to continue taking the medication until she sees Dr Bryson on 410/8/2024.. she stated understanding.

## 2024-09-30 NOTE — PROGRESS NOTES
Spoke to pt about getting a remote BP reading. She is on the BP gap report. She provided a reading. She will wait to schedule her mammogram. She asked me to send a message to PCP about her BP medication.

## 2024-10-02 ENCOUNTER — HOSPITAL ENCOUNTER (OUTPATIENT)
Dept: PULMONOLOGY | Facility: HOSPITAL | Age: 74
Discharge: HOME | End: 2024-10-02
Attending: INTERNAL MEDICINE
Payer: MEDICARE

## 2024-10-02 DIAGNOSIS — I49.3 PVC (PREMATURE VENTRICULAR CONTRACTION): ICD-10-CM

## 2024-10-02 DIAGNOSIS — I47.29 NONSUSTAINED VENTRICULAR TACHYCARDIA: ICD-10-CM

## 2024-10-02 DIAGNOSIS — G45.9 TRANSIENT CEREBRAL ISCHEMIA, UNSPECIFIED TYPE: ICD-10-CM

## 2024-10-02 DIAGNOSIS — R94.31 ABNORMAL ECG: ICD-10-CM

## 2024-10-02 LAB
CV STRESS BASE HR: 84 BPM
DIASTOLIC BLOOD PRESSURE: 102 MMHG
OHS CV CPX 1 MINUTE RECOVERY HEART RATE: 96 BPM
OHS CV CPX 85 PERCENT MAX PREDICTED HEART RATE MALE: 125
OHS CV CPX ESTIMATED METS: 5
OHS CV CPX MAX PREDICTED HEART RATE: 147
OHS CV CPX PATIENT IS FEMALE: 1
OHS CV CPX PATIENT IS MALE: 0
OHS CV CPX PEAK DIASTOLIC BLOOD PRESSURE: 100 MMHG
OHS CV CPX PEAK HEAR RATE: 123 BPM
OHS CV CPX PEAK RATE PRESSURE PRODUCT: NORMAL
OHS CV CPX PEAK SYSTOLIC BLOOD PRESSURE: 201 MMHG
OHS CV CPX PERCENT MAX PREDICTED HEART RATE ACHIEVED: 87
OHS CV CPX RATE PRESSURE PRODUCT PRESENTING: NORMAL
STRESS ECHO POST EXERCISE DUR MIN: 3 MINUTES
STRESS ECHO POST EXERCISE DUR SEC: 2 SECONDS
STRESS ST DEPRESSION: 1 MM
SYSTOLIC BLOOD PRESSURE: 167 MMHG

## 2024-10-02 PROCEDURE — 93016 CV STRESS TEST SUPVJ ONLY: CPT | Mod: HCNC,,, | Performed by: INTERNAL MEDICINE

## 2024-10-02 PROCEDURE — 93017 CV STRESS TEST TRACING ONLY: CPT | Mod: HCNC

## 2024-10-02 PROCEDURE — 93018 CV STRESS TEST I&R ONLY: CPT | Mod: HCNC,,, | Performed by: INTERNAL MEDICINE

## 2024-10-03 ENCOUNTER — TELEPHONE (OUTPATIENT)
Dept: CARDIOLOGY | Facility: CLINIC | Age: 74
End: 2024-10-03
Payer: MEDICARE

## 2024-10-03 DIAGNOSIS — R94.31 ABNORMAL ELECTROCARDIOGRAM (ECG) (EKG): Primary | ICD-10-CM

## 2024-10-03 NOTE — TELEPHONE ENCOUNTER
I  spoke to Kelli and she will add the Patient to the list to be called for a Nuc Stress test,  I couldn't leave a message for the patient because her voicemail is not set up.

## 2024-10-03 NOTE — TELEPHONE ENCOUNTER
----- Message from Scotty Garcia MD sent at 10/3/2024  9:02 AM CDT -----  Please call the patient and explained that a 2nd stress test will be needed.  The length of exercise she did on the stress test was less than optimal, there were Electrocardiogram changes that were equivocal.    Because of the abnormalities, a more definitive stress test-nuclear type is advised-nonwalking type.  I placed the order.

## 2024-10-08 ENCOUNTER — TELEPHONE (OUTPATIENT)
Dept: FAMILY MEDICINE | Facility: CLINIC | Age: 74
End: 2024-10-08
Payer: MEDICARE

## 2024-10-08 NOTE — TELEPHONE ENCOUNTER
----- Message from Tomer sent at 10/8/2024 12:41 PM CDT -----  Contact: pt  Raffi Amaro  MRN: [id number]  : 1950  PCP: Yahaira Bryson  Home Phone      [phone]  Work Phone      Not on file.  Mobile          [phone]      MESSAGE:     Pt called wanting to speak with nurse about her appt she had for today. She stated she couldn't make the appt today. She requested to be seen tomorrow. PAR didn't have anything until Dec.            Please advise  [phone]

## 2024-10-30 ENCOUNTER — OFFICE VISIT (OUTPATIENT)
Dept: FAMILY MEDICINE | Facility: CLINIC | Age: 74
End: 2024-10-30
Payer: MEDICARE

## 2024-10-30 VITALS
SYSTOLIC BLOOD PRESSURE: 142 MMHG | WEIGHT: 200.5 LBS | BODY MASS INDEX: 33.41 KG/M2 | HEIGHT: 65 IN | DIASTOLIC BLOOD PRESSURE: 90 MMHG

## 2024-10-30 DIAGNOSIS — I10 PRIMARY HYPERTENSION: ICD-10-CM

## 2024-10-30 DIAGNOSIS — N18.31 CHRONIC KIDNEY DISEASE, STAGE 3A: ICD-10-CM

## 2024-10-30 DIAGNOSIS — Z53.20 REFUSAL OF STATIN MEDICATION BY PATIENT: Primary | ICD-10-CM

## 2024-10-30 PROCEDURE — 3080F DIAST BP >= 90 MM HG: CPT | Mod: HCNC,CPTII,S$GLB, | Performed by: FAMILY MEDICINE

## 2024-10-30 PROCEDURE — 3077F SYST BP >= 140 MM HG: CPT | Mod: HCNC,CPTII,S$GLB, | Performed by: FAMILY MEDICINE

## 2024-10-30 PROCEDURE — 3061F NEG MICROALBUMINURIA REV: CPT | Mod: HCNC,CPTII,S$GLB, | Performed by: FAMILY MEDICINE

## 2024-10-30 PROCEDURE — 3008F BODY MASS INDEX DOCD: CPT | Mod: HCNC,CPTII,S$GLB, | Performed by: FAMILY MEDICINE

## 2024-10-30 PROCEDURE — 99999 PR PBB SHADOW E&M-EST. PATIENT-LVL III: CPT | Mod: PBBFAC,HCNC,, | Performed by: FAMILY MEDICINE

## 2024-10-30 PROCEDURE — 4010F ACE/ARB THERAPY RXD/TAKEN: CPT | Mod: HCNC,CPTII,S$GLB, | Performed by: FAMILY MEDICINE

## 2024-10-30 PROCEDURE — 1159F MED LIST DOCD IN RCRD: CPT | Mod: HCNC,CPTII,S$GLB, | Performed by: FAMILY MEDICINE

## 2024-10-30 PROCEDURE — 3066F NEPHROPATHY DOC TX: CPT | Mod: HCNC,CPTII,S$GLB, | Performed by: FAMILY MEDICINE

## 2024-10-30 PROCEDURE — 99213 OFFICE O/P EST LOW 20 MIN: CPT | Mod: HCNC,S$GLB,, | Performed by: FAMILY MEDICINE

## 2024-10-30 RX ORDER — LISINOPRIL 20 MG/1
20 TABLET ORAL DAILY
Qty: 90 TABLET | Refills: 3 | Status: SHIPPED | OUTPATIENT
Start: 2024-10-30 | End: 2025-10-30

## 2024-11-08 ENCOUNTER — TELEPHONE (OUTPATIENT)
Dept: FAMILY MEDICINE | Facility: CLINIC | Age: 74
End: 2024-11-08
Payer: MEDICARE

## 2024-11-08 NOTE — TELEPHONE ENCOUNTER
----- Message from [place] sent at 2024 10:03 AM CST -----  Contact: self  Raffi Amaro  MRN: [id number]  : 1950  PCP: Yahaira Bryson  Home Phone      [phone]  Work Phone      Not on file.  Mobile          [phone]      MESSAGE:   Pt states yesterday she had day pt states her blood pressure was up pt was like to speak with nurse.    Please advise  [phone]

## 2024-11-08 NOTE — TELEPHONE ENCOUNTER
Patient calling to report that her blood pressure has been elevated. States that she couldn't open one of her eyes yesterday. 11/07/2024 am 156/99 pm 120/63.  11/08/2024 am 163/107 no c/o of headache. Currently on lisinopril 20mg daily

## 2024-11-12 ENCOUNTER — TELEPHONE (OUTPATIENT)
Dept: FAMILY MEDICINE | Facility: CLINIC | Age: 74
End: 2024-11-12
Payer: MEDICARE

## 2024-11-12 NOTE — TELEPHONE ENCOUNTER
----- Message from Ivan sent at 2024  7:55 AM CST -----  Contact: Daughter - Hannah Amaro  MRN: [id number]  : 1950  PCP: Yahaira Bryson  Home Phone      [phone]  Work Phone      Not on file.  Mobile          [phone]      MESSAGE: left voicemail asking for call back - states she left previous message & did not hear back on it     Call Hannah @ [phone]    PCP: [place]

## 2024-11-14 ENCOUNTER — OFFICE VISIT (OUTPATIENT)
Dept: FAMILY MEDICINE | Facility: CLINIC | Age: 74
End: 2024-11-14
Payer: MEDICARE

## 2024-11-14 ENCOUNTER — HOSPITAL ENCOUNTER (EMERGENCY)
Facility: HOSPITAL | Age: 74
Discharge: HOME | End: 2024-11-14
Payer: MEDICARE

## 2024-11-14 VITALS
DIASTOLIC BLOOD PRESSURE: 70 MMHG | RESPIRATION RATE: 18 BRPM | HEIGHT: 65 IN | OXYGEN SATURATION: 98 % | HEART RATE: 69 BPM | BODY MASS INDEX: 33.37 KG/M2 | SYSTOLIC BLOOD PRESSURE: 118 MMHG

## 2024-11-14 VITALS
HEART RATE: 67 BPM | TEMPERATURE: 98 F | SYSTOLIC BLOOD PRESSURE: 160 MMHG | DIASTOLIC BLOOD PRESSURE: 85 MMHG | OXYGEN SATURATION: 98 % | RESPIRATION RATE: 18 BRPM
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
## 2024-11-14 DIAGNOSIS — S82.831A CLOSED FRACTURE OF DISTAL END OF RIGHT FIBULA, UNSPECIFIED FRACTURE MORPHOLOGY, INITIAL ENCOUNTER: Primary | ICD-10-CM

## 2024-11-14 DIAGNOSIS — M25.579 ANKLE PAIN: ICD-10-CM

## 2024-11-14 DIAGNOSIS — G45.9 TIA (TRANSIENT ISCHEMIC ATTACK): ICD-10-CM

## 2024-11-14 DIAGNOSIS — M25.571 ACUTE RIGHT ANKLE PAIN: Primary | ICD-10-CM

## 2024-11-14 PROCEDURE — 4010F ACE/ARB THERAPY RXD/TAKEN: CPT | Mod: HCNC,CPTII,S$GLB, | Performed by: NURSE PRACTITIONER

## 2024-11-14 PROCEDURE — 99214 OFFICE O/P EST MOD 30 MIN: CPT | Mod: HCNC,S$GLB,, | Performed by: NURSE PRACTITIONER

## 2024-11-14 PROCEDURE — 1159F MED LIST DOCD IN RCRD: CPT | Mod: HCNC,CPTII,S$GLB, | Performed by: NURSE PRACTITIONER

## 2024-11-14 PROCEDURE — 99283 EMERGENCY DEPT VISIT LOW MDM: CPT | Mod: 25,HCNC

## 2024-11-14 PROCEDURE — 99999 PR PBB SHADOW E&M-EST. PATIENT-LVL III: CPT | Mod: PBBFAC,HCNC,, | Performed by: NURSE PRACTITIONER

## 2024-11-14 PROCEDURE — 3008F BODY MASS INDEX DOCD: CPT | Mod: HCNC,CPTII,S$GLB, | Performed by: NURSE PRACTITIONER

## 2024-11-14 PROCEDURE — 3061F NEG MICROALBUMINURIA REV: CPT | Mod: HCNC,CPTII,S$GLB, | Performed by: NURSE PRACTITIONER

## 2024-11-14 PROCEDURE — 3074F SYST BP LT 130 MM HG: CPT | Mod: HCNC,CPTII,S$GLB, | Performed by: NURSE PRACTITIONER

## 2024-11-14 PROCEDURE — 1101F PT FALLS ASSESS-DOCD LE1/YR: CPT | Mod: HCNC,CPTII,S$GLB, | Performed by: NURSE PRACTITIONER

## 2024-11-14 PROCEDURE — 3078F DIAST BP <80 MM HG: CPT | Mod: HCNC,CPTII,S$GLB, | Performed by: NURSE PRACTITIONER

## 2024-11-14 PROCEDURE — 1160F RVW MEDS BY RX/DR IN RCRD: CPT | Mod: HCNC,CPTII,S$GLB, | Performed by: NURSE PRACTITIONER

## 2024-11-14 PROCEDURE — 3066F NEPHROPATHY DOC TX: CPT | Mod: HCNC,CPTII,S$GLB, | Performed by: NURSE PRACTITIONER

## 2024-11-14 PROCEDURE — 3288F FALL RISK ASSESSMENT DOCD: CPT | Mod: HCNC,CPTII,S$GLB, | Performed by: NURSE PRACTITIONER

## 2024-11-14 RX ORDER — HYDROCODONE BITARTRATE AND ACETAMINOPHEN 5; 325 MG/1; MG/1
1 TABLET ORAL EVERY 4 HOURS PRN
Qty: 15 TABLET | Refills: 0 | Status: SHIPPED | OUTPATIENT
Start: 2024-11-14 | End: 2024-11-14 | Stop reason: ALTCHOICE

## 2024-11-14 RX ORDER — ACETAMINOPHEN 500 MG
1000 TABLET ORAL
Status: DISCONTINUED | OUTPATIENT
Start: 2024-11-14 | End: 2024-11-14 | Stop reason: HOSPADM

## 2024-11-14 NOTE — ED PROVIDER NOTES
Encounter Date: 11/14/2024    Source of History:   Patient and medical record, without language barrier or      Chief complaint:  Foot Injury (Pt c/o right foot and ankle pain s/t trip and fall yesterday. Pt denies hitting head.)    HPI:  Raffi Amaro is a 74 y.o. female presenting with chief complaint of right ankle pain  Patient with a accidental slip & fall yesterday with right ankle pain afterwards on HX  Patient on exam has tenderness of the distal fibula, concerning for fracture diagnosis  Good distal pulses, good capillary refill, denies any other complaint on ER interview    ROS: A review of systems was conducted with pertinent positive and negative findings documented in HPI with all other systems reviewed and negative.  Review of Systems   Constitutional: Negative.    HENT: Negative.     Eyes: Negative.    Respiratory: Negative.     Cardiovascular: Negative.    Gastrointestinal: Negative.    Genitourinary: Negative.    Musculoskeletal:         (+) right ankle   Skin: Negative.    Neurological: Negative.    Psychiatric/Behavioral: Negative.         Review of patient's allergies indicates:   Allergen Reactions    Aspirin        PMH:  As per HPI and below:  History reviewed. No pertinent past medical history.  History reviewed. No pertinent surgical history.  Social History     Socioeconomic History    Marital status:    Tobacco Use    Smoking status: Never    Smokeless tobacco: Never   Substance and Sexual Activity    Alcohol use: No     Comment: socially    Drug use: No    Sexual activity: Yes     Vitals:    BP (!) 160/85 (BP Location: Left arm, Patient Position: Sitting)   Pulse 67   Temp 97.6 °F (36.4 °C) (Oral)   Resp 18   SpO2 98%     Physical Exam  Vitals reviewed.   Constitutional:       Appearance: Normal appearance. She is obese.   HENT:      Head: Normocephalic and atraumatic.      Mouth/Throat:      Mouth: Mucous membranes are moist.      Pharynx: Oropharynx is clear.    Eyes:      Extraocular Movements: Extraocular movements intact.      Conjunctiva/sclera: Conjunctivae normal.      Pupils: Pupils are equal, round, and reactive to light.   Cardiovascular:      Rate and Rhythm: Regular rhythm. Tachycardia present.   Pulmonary:      Effort: Pulmonary effort is normal.      Breath sounds: Normal breath sounds.   Abdominal:      General: Abdomen is flat. Bowel sounds are normal.      Palpations: Abdomen is soft.   Musculoskeletal:      Cervical back: Normal range of motion and neck supple.      Comments: (+) right lateral ankle tenderness   Skin:     General: Skin is warm and dry.   Neurological:      General: No focal deficit present.      Mental Status: She is alert and oriented to person, place, and time.       Procedures  Imaging Results              X-Ray Foot Complete Right (Final result)  Result time 11/14/24 18:45:56      Final result by Sheldon Crystal MD (11/14/24 18:45:56)                   Impression:      Acute minimally displaced fracture of the distal fibula extending to the ankle mortise with overlying soft tissue edema.  No significant widening of the ankle mortise appreciated.      Electronically signed by: Sheldon Crystal MD  Date:    11/14/2024  Time:    18:45               Narrative:    EXAMINATION:  XR ANKLE COMPLETE 3 VIEW RIGHT; XR FOOT COMPLETE 3 VIEW RIGHT    CLINICAL HISTORY:  Pain in unspecified ankle and joints of unspecified foot; Unspecified fall, initial encounter    TECHNIQUE:  AP, lateral, and oblique images of the right ankle and foot were performed.    COMPARISON:  Right foot radiograph series 01/09/2019    FINDINGS:  There is an acute minimally displaced transversely oriented fracture of the distal fibula extending to the ankle mortise.  No definite medial malleolar or posterior malleolar fracture identified.  There is soft tissue edema overlying the lateral malleolus.  The mortise appears maintained and symmetric.    No definite acute  displaced fracture of the right foot identified.  No abnormal widening of the Lisfranc interval appreciated.  No retained radiopaque foreign body identified.                                       X-Ray Ankle Complete Right (Final result)  Result time 11/14/24 18:45:56      Final result by Sheldon Crystal MD (11/14/24 18:45:56)                   Impression:      Acute minimally displaced fracture of the distal fibula extending to the ankle mortise with overlying soft tissue edema.  No significant widening of the ankle mortise appreciated.      Electronically signed by: Sheldon Crystal MD  Date:    11/14/2024  Time:    18:45               Narrative:    EXAMINATION:  XR ANKLE COMPLETE 3 VIEW RIGHT; XR FOOT COMPLETE 3 VIEW RIGHT    CLINICAL HISTORY:  Pain in unspecified ankle and joints of unspecified foot; Unspecified fall, initial encounter    TECHNIQUE:  AP, lateral, and oblique images of the right ankle and foot were performed.    COMPARISON:  Right foot radiograph series 01/09/2019    FINDINGS:  There is an acute minimally displaced transversely oriented fracture of the distal fibula extending to the ankle mortise.  No definite medial malleolar or posterior malleolar fracture identified.  There is soft tissue edema overlying the lateral malleolus.  The mortise appears maintained and symmetric.    No definite acute displaced fracture of the right foot identified.  No abnormal widening of the Lisfranc interval appreciated.  No retained radiopaque foreign body identified.                                    Medical Decision Making  Differential includes sprain, strain, fracture, dislocation, ligament/tendon injury    Problems Addressed:  Ankle pain: complicated acute illness or injury  Closed fracture of distal end of right fibula, unspecified fracture morphology, initial encounter: complicated acute illness or injury  Fall: complicated acute illness or injury    Amount and/or Complexity of Data  Reviewed  Radiology: ordered and independent interpretation performed.    ED Management & Risks of Complication, Morbidity, & Mortality:  Right fibula fracture, patient declines any splinting in the ER  Requesting a orthopedic boot, has a Orthopedics scooter at home  Nonweightbearing going forward, orthopedic follow-up placed  Pt/Family counseled to return to the ER with any concerns on DC    Need for Hospitalization or Surgery with Social Determinants of Health:  This patient does not need to be hospitalized on ER evaluation today  The patient's diagnosis is not limited by social determinants of health  Does not require surgery or procedure (major or minor), no risk factors    Diagnostic Impression:    Final diagnoses:  [M25.579] Ankle pain  [W19.XXXA] Fall  [S82.831A] Closed fracture of distal end of right fibula, unspecified fracture morphology, initial encounter (Primary)     ED Disposition Condition    Discharge Stable          ED Prescriptions       Medication Sig Dispense Start Date End Date Auth. Provider    HYDROcodone-acetaminophen (NORCO) 5-325 mg per tablet  (Status: Discontinued) Take 1 tablet by mouth every 4 (four) hours as needed for Pain. 15 tablet 11/14/2024 11/14/2024 Remberto Harrell MD          Follow-up Information       Follow up With Specialties Details Why Contact Info    Yahaira Bryson MD Family Medicine, Hospitalist Go in 2 days  [address]  [place] [address]  [phone]      Yeny Spear PA-C Orthopedic Surgery Go in 2 days  [address] [place] [address]  [phone]                  Remberto Harrell MD  11/14/24 1922

## 2024-11-14 NOTE — PROGRESS NOTES
"Patient daughter with patient to clinic today for evaluation. Daughter reports last Thursday evening-called patient after work and noticed a change to her speech, "off balance" and trouble forming sentences. Went to house and noted right eye with inability to open right eye only. Did not seek medical evaluation. Noted right eye swelling the following day with improvement to speech, but fatigue generalized remained.   Seen per Eye doctor Tuesday and evaluated for eye swelling with RX eye drops and referred to neurology due to loss of peripheral vision reported. Patient reported double vision at the time of eye provider exam. Denies eye pain, double vision, HA, weakness, dizziness, and trouble speaking.   Pt reported trip and fall over cats yesterday with complaint of right ankle pain, swelling, and bruising since. Pain worse with weight bearing.      "

## 2024-11-14 NOTE — PROGRESS NOTES
"Subjective     Patient ID: Raffi Aamro is a 74 y.o. female.    Chief Complaint: Belepharitis (Pt is here for swollen right eye; pt is taking Systance Eye Drops. Pt reports of lightheadedness and dizziness. Pt states she experiencing redness in the right eye.) and Foot Pain (Pt is here for pain and swelling in the right foot due to a fall. Pt has not received medical attention elsewhere. Pt reports of using ice/hot therapy and elevation to help with swelling and pain.)    Patient daughter with patient to clinic today for evaluation. Daughter reports last Thursday evening-called patient after work and noticed a change to her speech, "off balance" and trouble forming sentences. Went to house and noted right eye with inability to open right eye only. Did not seek medical evaluation. Noted right eye swelling the following day with improvement to speech, but fatigue generalized remained.   Seen per Eye doctor Tuesday and evaluated for eye swelling with RX eye drops and referred to neurology due to loss of peripheral vision reported. Patient reported double vision at the time of eye provider exam. Denies eye pain, double vision, HA, weakness, dizziness, and trouble speaking.   Pt reported trip and fall over cats yesterday with complaint of right ankle pain, swelling, and bruising since. Pain worse with weight bearing.      Foot Pain  Chronicity: S/P fall. Associated symptoms include arthralgias (right foot pain unable to bear weight). Pertinent negatives include no abdominal pain, chest pain, congestion, coughing, fatigue, fever, headaches, myalgias, nausea, sore throat, vertigo, vomiting or weakness.     Review of Systems   Constitutional: Negative.  Negative for appetite change, fatigue, fever and unexpected weight change.   HENT: Negative.  Negative for nasal congestion, ear pain and sore throat.    Eyes: Negative.  Negative for visual disturbance.        Right eye pain, swelling, redness resolving and evaluated by " ophthalmologist    Respiratory: Negative.  Negative for cough, shortness of breath and wheezing.    Cardiovascular:  Negative for chest pain.   Gastrointestinal: Negative.  Negative for abdominal pain, diarrhea, nausea and vomiting.   Endocrine: Negative.    Genitourinary: Negative.  Negative for difficulty urinating and urgency.   Musculoskeletal:  Positive for arthralgias (right foot pain unable to bear weight). Negative for myalgias.   Integumentary:  Negative for color change. Negative.   Allergic/Immunologic: Negative.    Neurological: Negative.  Negative for dizziness, vertigo, weakness and headaches.        Has had 2 episodes of weakness at the right face - daughter thinks it was a mini stroke but she would not go to ER. Has had a recent fall with bruising of the right lower leg and skinned knees pastora   Hematological: Negative.    Psychiatric/Behavioral: Negative.  Negative for sleep disturbance.    All other systems reviewed and are negative.         Objective     Physical Exam  Vitals and nursing note reviewed. Exam conducted with a chaperone present (Daughter).   Constitutional:       General: She is not in acute distress.     Appearance: Normal appearance. She is well-developed.   HENT:      Head: Normocephalic and atraumatic.   Eyes:      Pupils: Pupils are equal, round, and reactive to light.      Comments: Right eye swelling and redness - saw an eye doctor and has drops for her eye but was also concerned about a possible stroke   Cardiovascular:      Rate and Rhythm: Normal rate and regular rhythm.      Pulses: Normal pulses.      Heart sounds: Normal heart sounds. No murmur heard.  Pulmonary:      Effort: Pulmonary effort is normal. No respiratory distress.      Breath sounds: Normal breath sounds.   Abdominal:      Tenderness: There is no right CVA tenderness or left CVA tenderness.   Musculoskeletal:      Cervical back: Normal range of motion and neck supple.      Comments: In a wheelchair. Right  lower leg with bruising and swelling noted from nid shin to the mid foot. 2 dressings at the knees from her fall.    Skin:     General: Skin is warm and dry.   Neurological:      Mental Status: She is alert and oriented to person, place, and time.   Psychiatric:         Mood and Affect: Mood normal.          Assessment and Plan     1. Acute right ankle pain  -     X-Ray Ankle Complete Right; Future; Expected date: 11/14/2024    2. TIA (transient ischemic attack) ? - to ER if other episodes occur and needs neurology to evaluate her - stressed to her and daughter the importance of having rapid evaluation in the case of neurological event     Follow up in about 13 days (around 11/27/2024) with Dr. Bryson as scheduled.

## 2024-11-15 ENCOUNTER — PATIENT OUTREACH (OUTPATIENT)
Dept: EMERGENCY MEDICINE | Facility: HOSPITAL | Age: 74
End: 2024-11-15
Payer: MEDICARE

## 2024-11-15 NOTE — PROGRESS NOTES
Angie Zayas Patient Care Assistant  ED Navigator  Emergency Department    [place] [facility] [place]  Role: Community Health Worker    Date: 11/15/2024  Patient Name: Raffi Amaro  MRN: [id number]  PCP: Yahaira Bryson MD    Assessment:     Raffi Amaro is a 74 y.o. female who has presented to ED for a foot injury. Patient has visited the ED 1 times in the past 3 months. Patient did not contact PCP.     ED Navigator Initial Assessment    ED Navigator Enrollment Documentation  Consent to Services  Does patient consent to completing the assessment?: Yes  Contact  Method of Initial Contact: Phone  Transportation  Does the patient have issues with Transportation?: No  Does the patient have transportation to and from healthcare appointments?: Yes  Insurance Coverage  Do you have coverage/adequate coverage?: Yes  Type/kind of coverage: [employer] H[employer] PPO SPECIAL NEEDS  Is patient able to afford co-pays/deductibles?: Yes  Is patient able to afford HME or supplies?: Yes  Does patient have an established [facility] PCP?: Yes  Able to access?: Yes  Does the patient have a lack of adequate coverage?: No  Specialist Appointment  Did the patient come to the ED to see a specialist?: No  Does the patient have a pending specialist referral?: Yes  Does the patient have a specialist appointment made?: Yes  Is the patient able to access a timely specialist appointment?: Yes  PCP Follow Up Appointment  Has the patient had an appointment with a primary care provider in the past year?: Yes  Approximate date: 11/14/24  Provider: Vy Montaño NP  Does the patient have a follow up appontment with a PCP?: Yes  Upcoming appointment date: 11/27/24  Provider: Yahaira Bryson MD  When was the last time you saw your PCP?: 10/30/24  Medications  Is patient able to afford medication?: Yes  Is patient unable to get medication due to lack of transportation?: No  Psychological  Does the patient have psycho-social concerns?:  No  Food  Does the patient have concerns about food?: No  Communication/Education  Does the patient have limited English proficiency/English not primary language?: No  Does patient have low literacy and/or low health literacy?: Yes  Does patient have concerns with care?: No  Does patient have dissatisfaction with care?: No  Other Financial Concerns  Does the patient have immediate financial distress?: No  Does the patient have general financial concerns?: No  Other Social Barriers/Concerns  Does the patient have any additional barriers or concerns?: None  Primary Barrier  Barriers identified: Financial barrier (health insurance, employment, etc.), Structural barrier (service availability, waiting times, etc.), Cognitive barrier (health literacy, language and communication, etc.)  Root Cause of ED Utilization: Chronic Conditions  Plan to address Chronic Conditions: Schedule appointment for patient with their PCP/specialist per ED discharge instructions, Remind patient of upcoming PCP/specialist appointment within 24 to 48 hours before scheduled appt  Next steps: Provided Education, Scheduled Appointment/Referral  Scheduled Appointment Date: 11/19/24  Appointment Reminder Date: 11/18/24  Was education/educational materials provided surrounding PCP services/creating a medical home?: Yes      Was education/educational materials provided surrounding low cost, healthy foods?: No      Was education/educational materials provided surrounding other items? If so, use comment to explain.: No    Plan: Provided information for [facility] 24/7 Nurse triage line, [phone] or [phone] ([phone])  Expected Date of Follow Up 1: 11/18/24  Additional Documentation: Pt is doing the best she can, as expressed. Pt agreed to scheduling orthopedic appt. Pt was able to write date, time, and address down. Pt did not need resources. Pt has no concerns with medications, transportation, or insurance. Pt is agreeable to  follow-up calls.    ED navigator scheduled pt with Yeny Spear PA-C for 11/19/2024 @ 11:30 a.m. ED navigator ensured patient had no other needs at this time. ED navigator reminded patient to reach out if there is ever anything she can assist with. ED navigator will follow-up with patient on/around 11/18/2024.    Angie Zayas  ED Navigator  [phone]          Social History     Socioeconomic History    Marital status:    Tobacco Use    Smoking status: Never    Smokeless tobacco: Never   Substance and Sexual Activity    Alcohol use: No     Comment: socially    Drug use: No    Sexual activity: Yes     Social Drivers of Health     Financial Resource Strain: Low Risk  (11/15/2024)    Overall Financial Resource Strain (CARDIA)     Difficulty of Paying Living Expenses: Not hard at all   Food Insecurity: No Food Insecurity (11/15/2024)    Hunger Vital Sign     Worried About Running Out of Food in the Last Year: Never true     Ran Out of Food in the Last Year: Never true   Transportation Needs: No Transportation Needs (11/15/2024)    PRAPARE - Transportation     Lack of Transportation (Medical): No     Lack of Transportation (Non-Medical): No   Physical Activity: Inactive (11/15/2024)    Exercise Vital Sign     Days of Exercise per Week: 0 days     Minutes of Exercise per Session: 0 min   Stress: No Stress Concern Present (11/15/2024)    [employer] - Occupational Stress Questionnaire     Feeling of Stress : Not at all   Housing Stability: Low Risk  (11/15/2024)    Housing Stability Vital Sign     Unable to Pay for Housing in the Last Year: No     Homeless in the Last Year: No       Plan:       Pt is doing the best she can, as expressed. Pt agreed to scheduling orthopedic appt. Pt was able to write date, time, and address down. Pt did not need resources. Pt has no concerns with medications, transportation, or insurance. Pt is agreeable to follow-up calls.    ED navigator  scheduled pt with Yeny Spear PA-C for 11/19/2024 @ 11:30 a.m. ED navigator ensured patient had no other needs at this time. ED navigator reminded patient to reach out if there is ever anything she can assist with. ED navigator will follow-up with patient on/around 11/18/2024.    Angie Zayas  ED Navigator  [phone]

## 2024-11-18 ENCOUNTER — PATIENT OUTREACH (OUTPATIENT)
Dept: EMERGENCY MEDICINE | Facility: HOSPITAL | Age: 74
End: 2024-11-18
Payer: MEDICARE

## 2024-11-18 NOTE — PROGRESS NOTES
Pt was reminded about and will be attending her appointment on tomorrow for 11:30 a.m. with Yeny Spear PA-C. [facility]  [phone]

## 2024-11-18 NOTE — PROGRESS NOTES
Pt is doing okay, and will be attending appointment on tomorrow. Pt has no needs today.    ED navigator ensured there was nothing she could help patient with. ED navigator reminded patient to reach out if there is ever anything she can assist with. ED navigator will follow-up with patient on/around 11/27/2024.    Angie Zayas  ED Navigator  [phone]

## 2024-11-19 ENCOUNTER — TELEPHONE (OUTPATIENT)
Dept: ORTHOPEDICS | Facility: CLINIC | Age: 74
End: 2024-11-19
Payer: MEDICARE

## 2024-11-19 ENCOUNTER — OFFICE VISIT (OUTPATIENT)
Dept: ORTHOPEDICS | Facility: CLINIC | Age: 74
End: 2024-11-19
Payer: MEDICARE

## 2024-11-19 ENCOUNTER — HOSPITAL ENCOUNTER (OUTPATIENT)
Dept: RADIOLOGY | Facility: HOSPITAL | Age: 74
Discharge: HOME | End: 2024-11-19
Attending: PHYSICIAN ASSISTANT
Payer: MEDICARE

## 2024-11-19 ENCOUNTER — TELEPHONE (OUTPATIENT)
Dept: ORTHOPEDICS | Facility: CLINIC | Age: 74
End: 2024-11-19

## 2024-11-19 VITALS
DIASTOLIC BLOOD PRESSURE: 84 MMHG | OXYGEN SATURATION: 97 % | RESPIRATION RATE: 16 BRPM | HEIGHT: 65 IN | BODY MASS INDEX: 33.37 KG/M2 | HEART RATE: 77 BPM | SYSTOLIC BLOOD PRESSURE: 126 MMHG

## 2024-11-19 DIAGNOSIS — R52 PAIN: Primary | ICD-10-CM

## 2024-11-19 DIAGNOSIS — S82.831A CLOSED FRACTURE OF DISTAL END OF RIGHT FIBULA, UNSPECIFIED FRACTURE MORPHOLOGY, INITIAL ENCOUNTER: Primary | ICD-10-CM

## 2024-11-19 DIAGNOSIS — S82.831A CLOSED FRACTURE OF DISTAL END OF RIGHT FIBULA, UNSPECIFIED FRACTURE MORPHOLOGY, INITIAL ENCOUNTER: ICD-10-CM

## 2024-11-19 PROCEDURE — 3079F DIAST BP 80-89 MM HG: CPT | Mod: HCNC,CPTII,S$GLB, | Performed by: PHYSICIAN ASSISTANT

## 2024-11-19 PROCEDURE — 3061F NEG MICROALBUMINURIA REV: CPT | Mod: HCNC,CPTII,S$GLB, | Performed by: PHYSICIAN ASSISTANT

## 2024-11-19 PROCEDURE — 3066F NEPHROPATHY DOC TX: CPT | Mod: HCNC,CPTII,S$GLB, | Performed by: PHYSICIAN ASSISTANT

## 2024-11-19 PROCEDURE — 3008F BODY MASS INDEX DOCD: CPT | Mod: HCNC,CPTII,S$GLB, | Performed by: PHYSICIAN ASSISTANT

## 2024-11-19 PROCEDURE — 4010F ACE/ARB THERAPY RXD/TAKEN: CPT | Mod: HCNC,CPTII,S$GLB, | Performed by: PHYSICIAN ASSISTANT

## 2024-11-19 PROCEDURE — 99213 OFFICE O/P EST LOW 20 MIN: CPT | Mod: HCNC,S$GLB,, | Performed by: PHYSICIAN ASSISTANT

## 2024-11-19 PROCEDURE — 73610 X-RAY EXAM OF ANKLE: CPT | Mod: 26,HCNC,RT, | Performed by: RADIOLOGY

## 2024-11-19 PROCEDURE — 3074F SYST BP LT 130 MM HG: CPT | Mod: HCNC,CPTII,S$GLB, | Performed by: PHYSICIAN ASSISTANT

## 2024-11-19 PROCEDURE — 73610 X-RAY EXAM OF ANKLE: CPT | Mod: TC,HCNC,RT

## 2024-11-19 PROCEDURE — 1126F AMNT PAIN NOTED NONE PRSNT: CPT | Mod: HCNC,CPTII,S$GLB, | Performed by: PHYSICIAN ASSISTANT

## 2024-11-19 PROCEDURE — 99999 PR PBB SHADOW E&M-EST. PATIENT-LVL IV: CPT | Mod: PBBFAC,HCNC,, | Performed by: PHYSICIAN ASSISTANT

## 2024-11-19 PROCEDURE — 1160F RVW MEDS BY RX/DR IN RCRD: CPT | Mod: HCNC,CPTII,S$GLB, | Performed by: PHYSICIAN ASSISTANT

## 2024-11-19 PROCEDURE — 1159F MED LIST DOCD IN RCRD: CPT | Mod: HCNC,CPTII,S$GLB, | Performed by: PHYSICIAN ASSISTANT

## 2024-11-19 RX ORDER — HYDROCODONE BITARTRATE AND ACETAMINOPHEN 5; 325 MG/1; MG/1
1 TABLET ORAL EVERY 4 HOURS PRN
COMMUNITY
Start: 2024-11-15

## 2024-11-19 NOTE — PROGRESS NOTES
Subjective:      Patient ID: Raffi Amaro is a 74 y.o. female.    Chief Complaint: Pain of the Right Ankle    Review of patient's allergies indicates:   Allergen Reactions    Aspirin       73 yo F presents to clinic with right distal fibula fracture.  She tripped over her cat 5 days ago and fell, twisting her ankle.  Seen in ED day after injury and diagnosed with distal fibula fracture, was placed in CAM boot, using crutches.  She reports mild pain and swelling to lateral ankle.  Taking norco as prescribed by ED as needed.      Review of Systems   Constitutional: Negative for chills, diaphoresis and fever.   HENT:  Negative for congestion, ear discharge and ear pain.    Eyes:  Negative for blurred vision, discharge, double vision and pain.   Cardiovascular:  Negative for chest pain, claudication and cyanosis.   Respiratory:  Negative for cough, hemoptysis and shortness of breath.    Endocrine: Negative for cold intolerance and heat intolerance.   Skin:  Negative for color change, dry skin, itching and rash.   Musculoskeletal:  Positive for joint pain and joint swelling. Negative for arthritis, back pain, falls, gout, muscle weakness and neck pain.   Gastrointestinal:  Negative for abdominal pain and change in bowel habit.   Neurological:  Negative for brief paralysis, disturbances in coordination, dizziness, numbness and paresthesias.   Psychiatric/Behavioral:  Negative for altered mental status and depression.          Objective:          General    Constitutional: She is oriented to person, place, and time. She appears well-developed and well-nourished. No distress.   HENT:   Head: Atraumatic.   Eyes: EOM are normal. Right eye exhibits no discharge. Left eye exhibits no discharge.   Cardiovascular:  Normal rate.            Pulmonary/Chest: Effort normal. No respiratory distress.   Abdominal: Soft.   Neurological: She is alert and oriented to person, place, and time.   Psychiatric: She has a normal mood and  affect. Her behavior is normal.         Right Ankle/Foot Exam     Inspection   Scars: absent  Deformity: absent  Erythema: absent  Bruising: Ankle - absent Foot - absent    Swelling   The patient is swollen on the lateral malleolus.    Pain   The patient exhibits pain of the lateral malleolus.    Range of Motion   The patient has normal right ankle ROM.  Navarro Test:  negative  First MTP Joint: normal    Other   Sensation: normal    Left Ankle/Foot Exam     Inspection  Deformity: absent  Erythema: absent  Bruising: Ankle - absent Foot - absent  Scars: absent    Range of Motion   The patient has normal left ankle ROM.   Navarro Test:  normal  First MTP Joint: normal    Other   Sensation: normal      Vascular Exam       Edema  Right Lower Leg: absent  Left Lower Leg: absent              Assessment:         Xray Right Ankle 11/19/24:  Nondisplaced fracture of the right lateral malleolus with soft tissue edema       Encounter Diagnosis   Name Primary?    Closed fracture of distal end of right fibula, unspecified fracture morphology, initial encounter Yes    Closed fracture of distal end of right fibula, unspecified fracture morphology, initial encounter  -     Ambulatory referral/consult to Orthopedics  -     E - OTHER               Plan:         We discussed diagnosis and treatment options.  Pt requesting knee scooter.  Messaged Dr. Bai for xray review.    WBAT in CAM boot.  Ice compress to the affected area 2-3x a day for 15-20 minutes as needed for pain management.  Continue norco as prescribed by ED as needed.  Knee scooter as requested by pt for walking long distances.  RTC in 3 weeks for follow up with repeat xrays, sooner if needed.    Patient voices understanding of and agreement with treatment plan. All of the patient's questions were answered, and the patient will contact us if she has any questions or concerns in the interim.

## 2024-11-20 ENCOUNTER — TELEPHONE (OUTPATIENT)
Dept: ORTHOPEDICS | Facility: CLINIC | Age: 74
End: 2024-11-20
Payer: MEDICARE

## 2024-11-20 NOTE — TELEPHONE ENCOUNTER
"Pt was calling regards to the scooter Yeny had put an order in but explained to me that she can not tell me who or what "texted" her because she does not text and has nothing saved. I explained to pt that once they receive the order they will call her.          ----- Message from Vy sent at 11/20/2024 10:23 AM CST -----  Type:  Needs Medical Advice    Who Called: Patient  Best Call Back Number: [phone]  Additional Information: Patient is requesting a call back, thank you  "

## 2024-11-27 ENCOUNTER — OFFICE VISIT (OUTPATIENT)
Dept: FAMILY MEDICINE | Facility: CLINIC | Age: 74
End: 2024-11-27
Payer: MEDICARE

## 2024-11-27 VITALS
OXYGEN SATURATION: 97 % | HEIGHT: 65 IN | BODY MASS INDEX: 32.36 KG/M2 | DIASTOLIC BLOOD PRESSURE: 90 MMHG | SYSTOLIC BLOOD PRESSURE: 132 MMHG | HEART RATE: 72 BPM | RESPIRATION RATE: 16 BRPM | WEIGHT: 194.25 LBS

## 2024-11-27 DIAGNOSIS — S82.831D OTHER CLOSED FRACTURE OF DISTAL END OF RIGHT FIBULA WITH ROUTINE HEALING, SUBSEQUENT ENCOUNTER: Primary | ICD-10-CM

## 2024-11-27 DIAGNOSIS — I10 PRIMARY HYPERTENSION: ICD-10-CM

## 2024-11-27 DIAGNOSIS — G45.9 TIA (TRANSIENT ISCHEMIC ATTACK): ICD-10-CM

## 2024-11-27 PROCEDURE — 3075F SYST BP GE 130 - 139MM HG: CPT | Mod: HCNC,CPTII,S$GLB, | Performed by: FAMILY MEDICINE

## 2024-11-27 PROCEDURE — 3066F NEPHROPATHY DOC TX: CPT | Mod: HCNC,CPTII,S$GLB, | Performed by: FAMILY MEDICINE

## 2024-11-27 PROCEDURE — 1159F MED LIST DOCD IN RCRD: CPT | Mod: HCNC,CPTII,S$GLB, | Performed by: FAMILY MEDICINE

## 2024-11-27 PROCEDURE — 3061F NEG MICROALBUMINURIA REV: CPT | Mod: HCNC,CPTII,S$GLB, | Performed by: FAMILY MEDICINE

## 2024-11-27 PROCEDURE — 3288F FALL RISK ASSESSMENT DOCD: CPT | Mod: HCNC,CPTII,S$GLB, | Performed by: FAMILY MEDICINE

## 2024-11-27 PROCEDURE — 3008F BODY MASS INDEX DOCD: CPT | Mod: HCNC,CPTII,S$GLB, | Performed by: FAMILY MEDICINE

## 2024-11-27 PROCEDURE — 3080F DIAST BP >= 90 MM HG: CPT | Mod: HCNC,CPTII,S$GLB, | Performed by: FAMILY MEDICINE

## 2024-11-27 PROCEDURE — 99999 PR PBB SHADOW E&M-EST. PATIENT-LVL III: CPT | Mod: PBBFAC,HCNC,, | Performed by: FAMILY MEDICINE

## 2024-11-27 PROCEDURE — 99214 OFFICE O/P EST MOD 30 MIN: CPT | Mod: HCNC,S$GLB,, | Performed by: FAMILY MEDICINE

## 2024-11-27 PROCEDURE — 1125F AMNT PAIN NOTED PAIN PRSNT: CPT | Mod: HCNC,CPTII,S$GLB, | Performed by: FAMILY MEDICINE

## 2024-11-27 PROCEDURE — 1101F PT FALLS ASSESS-DOCD LE1/YR: CPT | Mod: HCNC,CPTII,S$GLB, | Performed by: FAMILY MEDICINE

## 2024-11-27 PROCEDURE — 4010F ACE/ARB THERAPY RXD/TAKEN: CPT | Mod: HCNC,CPTII,S$GLB, | Performed by: FAMILY MEDICINE

## 2024-11-27 RX ORDER — LISINOPRIL 5 MG/1
5 TABLET ORAL DAILY
Qty: 30 TABLET | Refills: 5 | Status: SHIPPED | OUTPATIENT
Start: 2024-11-27 | End: 2025-11-27

## 2024-11-27 RX ORDER — CLOPIDOGREL BISULFATE 75 MG/1
75 TABLET, FILM COATED ORAL
COMMUNITY
Start: 2024-11-21

## 2024-11-27 RX ORDER — LISINOPRIL 10 MG/1
10 TABLET ORAL DAILY
Qty: 90 TABLET | Refills: 3 | Status: SHIPPED | OUTPATIENT
Start: 2024-11-27 | End: 2025-11-27

## 2024-11-27 NOTE — PROGRESS NOTES
Subjective:       Patient ID: Raffi Amaro is a 74 y.o. female.    Chief Complaint: Follow-up (Patient is here today for a 1 month follow up visit. )    History of Present Illness  The patient presents for evaluation of multiple medical concerns. She is accompanied by an adult female.    She experienced a fall, resulting in a foot injury. She has been using crutches and a wheelchair for mobility. She has been applying ice to her foot after removing the boot. Her mobility is limited, affecting her ability to visit the coffee shop or grocery store.    Following the incident on 12/07/2024, she consulted her eye doctor due to a bloodshot eye that appeared bruised. The eye doctor referred her to a neurologist, Dr. Kaiser, who she saw last week. He reviewed her previous stroke workup and found it satisfactory. An MRI of her brain was conducted yesterday morning at [facility]. He prescribed Plavix as she was unable to tolerate aspirin due to stomach upset.    She has discontinued her blood pressure medication, believing it to be the cause of her discomfort. She reports feeling better and experiencing less bone pain since stopping the medication. She has completed her course of lisinopril 10 mg but didn't tolerate going up to 20mg.    Objective:      Physical Exam        Physical Exam  Vitals and nursing note reviewed.   Constitutional:       General: She is not in acute distress.     Appearance: She is well-developed.   HENT:      Head: Normocephalic and atraumatic.   Eyes:      Conjunctiva/sclera: Conjunctivae normal.      Pupils: Pupils are equal, round, and reactive to light.   Neck:      Thyroid: No thyromegaly.   Cardiovascular:      Rate and Rhythm: Normal rate and regular rhythm.      Heart sounds: Normal heart sounds.   Pulmonary:      Effort: Pulmonary effort is normal. No respiratory distress.      Breath sounds: Normal breath sounds. No wheezing.   Abdominal:      General: Bowel sounds are normal.       "Palpations: Abdomen is soft.      Tenderness: There is no abdominal tenderness.   Musculoskeletal:         General: Signs of injury (right leg in boot) present. Normal range of motion.      Cervical back: Normal range of motion and neck supple.   Lymphadenopathy:      Cervical: No cervical adenopathy.   Skin:     General: Skin is warm and dry.      Findings: No rash.   Neurological:      Mental Status: She is alert and oriented to person, place, and time.   Psychiatric:         Behavior: Behavior normal.         Results    Assessment:           1. Other closed fracture of distal end of right fibula with routine healing, subsequent encounter    2. TIA (transient ischemic attack)    3. Primary hypertension        Plan:     Assessment & Plan  1. Hypertension.  Her elevated blood pressure could potentially lead to a stroke. The goal is to gradually reduce her blood pressure to around 120 mmHg. A prescription for lisinopril 10 mg daily for 2 weeks has been provided, with an additional 5 mg to be added for the subsequent 2 weeks. If she experiences any adverse effects upon increasing the dose to 15 mg, she is advised to revert to the 10 mg dose. She is instructed to continue taking her blood pressure medication even if it makes her feel bad initially, as the body needs time to adjust.     2. Transient Ischemic Attack (TIA).  She had an episode where she fell and had difficulty finding her words, which is suspected to be a TIA. An MRI of the brain was done yesterday morning without contrast. She is currently on Plavix (clopidogrel) as she cannot tolerate aspirin. The neurologist will continue to monitor her condition.    3. Broken Leg.  She has a broken leg and is currently using a boot. A scooter has been ordered for her to aid in mobility. She is advised to keep the boot on and use ice packs to manage swelling and pain.    Follow-up  Return in 1 month for follow up.    Raffi Childs" was seen today for " follow-up.    Diagnoses and all orders for this visit:    Other closed fracture of distal end of right fibula with routine healing, subsequent encounter    TIA (transient ischemic attack)    Primary hypertension    Other orders  -     lisinopriL 10 MG tablet; Take 1 tablet (10 mg total) by mouth once daily.  -     lisinopriL (PRINIVIL,ZESTRIL) 5 MG tablet; Take 1 tablet (5 mg total) by mouth once daily.          RTC if condition acutely worsens or any other concerns, otherwise RTC as scheduled      This note was generated with the assistance of ambient listening technology. Verbal consent was obtained by the patient and accompanying visitor(s) for the recording of patient appointment to facilitate this note. I attest to having reviewed and edited the generated note for accuracy, though some syntax or spelling errors may persist. Please contact the author of this note for any clarification.

## 2024-12-04 ENCOUNTER — TELEPHONE (OUTPATIENT)
Dept: FAMILY MEDICINE | Facility: CLINIC | Age: 74
End: 2024-12-04
Payer: MEDICARE

## 2024-12-04 NOTE — TELEPHONE ENCOUNTER
Patient contacted and reviewed previous instructions on how to take her lisinopril. She v/u. The scooter was ordered by orthopedics. Advised to call for update from them. She v/u

## 2024-12-04 NOTE — TELEPHONE ENCOUNTER
----- Message from Tomer sent at 12/3/2024  3:03 PM CST -----  Contact: pt  Raffi Amaro  MRN: [id number]  : 1950  PCP: Yahaira Bryson  Home Phone      [phone]  Work Phone      Not on file.  Mobile          [phone]      MESSAGE:     Pt called wanting to speak with nurse about her 2 BP medications and she wanted to know status of scooter that was supposed to be ordered.        Please advise  [phone]

## 2024-12-11 ENCOUNTER — OFFICE VISIT (OUTPATIENT)
Dept: ORTHOPEDICS | Facility: CLINIC | Age: 74
End: 2024-12-11
Payer: MEDICARE

## 2024-12-11 ENCOUNTER — HOSPITAL ENCOUNTER (OUTPATIENT)
Dept: RADIOLOGY | Facility: HOSPITAL | Age: 74
Discharge: HOME | End: 2024-12-11
Attending: PHYSICIAN ASSISTANT
Payer: MEDICARE

## 2024-12-11 VITALS
HEIGHT: 65 IN | RESPIRATION RATE: 16 BRPM | SYSTOLIC BLOOD PRESSURE: 134 MMHG | DIASTOLIC BLOOD PRESSURE: 86 MMHG | BODY MASS INDEX: 32.32 KG/M2 | OXYGEN SATURATION: 99 % | HEART RATE: 71 BPM

## 2024-12-11 DIAGNOSIS — R52 PAIN: ICD-10-CM

## 2024-12-11 DIAGNOSIS — S82.831A CLOSED FRACTURE OF DISTAL END OF RIGHT FIBULA, UNSPECIFIED FRACTURE MORPHOLOGY, INITIAL ENCOUNTER: Primary | ICD-10-CM

## 2024-12-11 PROCEDURE — 1126F AMNT PAIN NOTED NONE PRSNT: CPT | Mod: HCNC,CPTII,S$GLB, | Performed by: PHYSICIAN ASSISTANT

## 2024-12-11 PROCEDURE — 99999 PR PBB SHADOW E&M-EST. PATIENT-LVL IV: CPT | Mod: PBBFAC,HCNC,, | Performed by: PHYSICIAN ASSISTANT

## 2024-12-11 PROCEDURE — 1160F RVW MEDS BY RX/DR IN RCRD: CPT | Mod: HCNC,CPTII,S$GLB, | Performed by: PHYSICIAN ASSISTANT

## 2024-12-11 PROCEDURE — 73610 X-RAY EXAM OF ANKLE: CPT | Mod: 26,HCNC,RT, | Performed by: RADIOLOGY

## 2024-12-11 PROCEDURE — 73610 X-RAY EXAM OF ANKLE: CPT | Mod: TC,HCNC,RT

## 2024-12-11 PROCEDURE — 4010F ACE/ARB THERAPY RXD/TAKEN: CPT | Mod: HCNC,CPTII,S$GLB, | Performed by: PHYSICIAN ASSISTANT

## 2024-12-11 PROCEDURE — 99213 OFFICE O/P EST LOW 20 MIN: CPT | Mod: HCNC,S$GLB,, | Performed by: PHYSICIAN ASSISTANT

## 2024-12-11 PROCEDURE — 3075F SYST BP GE 130 - 139MM HG: CPT | Mod: HCNC,CPTII,S$GLB, | Performed by: PHYSICIAN ASSISTANT

## 2024-12-11 PROCEDURE — 3079F DIAST BP 80-89 MM HG: CPT | Mod: HCNC,CPTII,S$GLB, | Performed by: PHYSICIAN ASSISTANT

## 2024-12-11 PROCEDURE — 3066F NEPHROPATHY DOC TX: CPT | Mod: HCNC,CPTII,S$GLB, | Performed by: PHYSICIAN ASSISTANT

## 2024-12-11 PROCEDURE — 3061F NEG MICROALBUMINURIA REV: CPT | Mod: HCNC,CPTII,S$GLB, | Performed by: PHYSICIAN ASSISTANT

## 2024-12-11 PROCEDURE — 1159F MED LIST DOCD IN RCRD: CPT | Mod: HCNC,CPTII,S$GLB, | Performed by: PHYSICIAN ASSISTANT

## 2024-12-11 PROCEDURE — 3008F BODY MASS INDEX DOCD: CPT | Mod: HCNC,CPTII,S$GLB, | Performed by: PHYSICIAN ASSISTANT

## 2024-12-11 NOTE — PROGRESS NOTES
Subjective:      Patient ID: Raffi Amaro is a 74 y.o. female.    Chief Complaint: Injury of the Right Ankle    Review of patient's allergies indicates:   Allergen Reactions    Aspirin       Pt returns to clinic for follow up of right distal fibula fracture.  She reports that pain and swelling have completely improved.  She has not been wearing boot when walking around her house.    11/19/24:  73 yo F presents to clinic with right distal fibula fracture.  She tripped over her cat 5 days ago and fell, twisting her ankle.  Seen in ED day after injury and diagnosed with distal fibula fracture, was placed in CAM boot, using crutches.  She reports mild pain and swelling to lateral ankle.  Taking norco as prescribed by ED as needed.        Review of Systems   Constitutional: Negative for chills, diaphoresis and fever.   HENT:  Negative for congestion, ear discharge and ear pain.    Eyes:  Negative for blurred vision, discharge, double vision and pain.   Cardiovascular:  Negative for chest pain, claudication and cyanosis.   Respiratory:  Negative for cough, hemoptysis and shortness of breath.    Endocrine: Negative for cold intolerance and heat intolerance.   Skin:  Negative for color change, dry skin, itching and rash.   Musculoskeletal:  Positive for joint pain and joint swelling. Negative for arthritis, back pain, falls, gout, muscle weakness and neck pain.   Gastrointestinal:  Negative for abdominal pain and change in bowel habit.   Neurological:  Negative for brief paralysis, disturbances in coordination, dizziness, numbness and paresthesias.   Psychiatric/Behavioral:  Negative for altered mental status and depression.          Objective:          General    Constitutional: She is oriented to person, place, and time. She appears well-developed and well-nourished. No distress.   HENT:   Head: Atraumatic.   Eyes: EOM are normal. Right eye exhibits no discharge. Left eye exhibits no discharge.   Cardiovascular:   Normal rate.            Pulmonary/Chest: Effort normal. No respiratory distress.   Abdominal: Soft.   Neurological: She is alert and oriented to person, place, and time.   Psychiatric: She has a normal mood and affect. Her behavior is normal.         Right Ankle/Foot Exam     Inspection   Scars: absent  Deformity: absent  Erythema: absent  Bruising: Ankle - absent Foot - absent    Range of Motion   The patient has normal right ankle ROM.  Navarro Test:  negative  First MTP Joint: normal    Other   Sensation: normal    Comments:  No pain or swelling    Left Ankle/Foot Exam     Inspection  Deformity: absent  Erythema: absent  Bruising: Ankle - absent Foot - absent  Scars: absent    Range of Motion   The patient has normal left ankle ROM.   Navarro Test:  normal  First MTP Joint: normal    Other   Sensation: normal      Vascular Exam       Edema  Right Lower Leg: absent  Left Lower Leg: absent              Assessment:         Xray Right Ankle 11/19/24:  Nondisplaced fracture of the right lateral malleolus with soft tissue edema       Encounter Diagnosis   Name Primary?    Closed fracture of distal end of right fibula, unspecified fracture morphology, initial encounter Yes      Closed fracture of distal end of right fibula, unspecified fracture morphology, initial encounter                 Plan:         We discussed diagnosis and treatment options.  Messaged Dr. Bai for xray review.    WBAT in CAM boot.  Ice compress to the affected area 2-3x a day for 15-20 minutes as needed for pain management.  RTC in 3 weeks for follow up with repeat xrays, sooner if needed.    Patient voices understanding of and agreement with treatment plan. All of the patient's questions were answered, and the patient will contact us if she has any questions or concerns in the interim.

## 2024-12-20 ENCOUNTER — CLINICAL SUPPORT (OUTPATIENT)
Dept: FAMILY MEDICINE | Facility: CLINIC | Age: 74
End: 2024-12-20
Payer: MEDICARE

## 2024-12-20 ENCOUNTER — OFFICE VISIT (OUTPATIENT)
Dept: FAMILY MEDICINE | Facility: CLINIC | Age: 74
End: 2024-12-20
Payer: MEDICARE

## 2024-12-20 ENCOUNTER — TELEPHONE (OUTPATIENT)
Dept: FAMILY MEDICINE | Facility: CLINIC | Age: 74
End: 2024-12-20

## 2024-12-20 VITALS
BODY MASS INDEX: 32.87 KG/M2 | HEART RATE: 77 BPM | SYSTOLIC BLOOD PRESSURE: 138 MMHG | HEIGHT: 65 IN | WEIGHT: 197.31 LBS | RESPIRATION RATE: 16 BRPM | DIASTOLIC BLOOD PRESSURE: 80 MMHG | OXYGEN SATURATION: 98 %

## 2024-12-20 DIAGNOSIS — I10 HYPERTENSION, UNSPECIFIED TYPE: Primary | ICD-10-CM

## 2024-12-20 DIAGNOSIS — I10 HYPERTENSION, UNSPECIFIED TYPE: ICD-10-CM

## 2024-12-20 LAB
ANION GAP SERPL CALC-SCNC: 7 MMOL/L (ref 8–16)
BUN SERPL-MCNC: 12 MG/DL (ref 8–23)
CALCIUM SERPL-MCNC: 9.5 MG/DL (ref 8.7–10.5)
CHLORIDE SERPL-SCNC: 109 MMOL/L (ref 95–110)
CHOLEST SERPL-MCNC: 167 MG/DL (ref 120–199)
CHOLEST/HDLC SERPL: 3.8 {RATIO} (ref 2–5)
CO2 SERPL-SCNC: 26 MMOL/L (ref 23–29)
CREAT SERPL-MCNC: 1 MG/DL (ref 0.5–1.4)
EST. GFR  (NO RACE VARIABLE): 59 ML/MIN/1.73 M^2
GLUCOSE SERPL-MCNC: 97 MG/DL (ref 70–110)
HDLC SERPL-MCNC: 44 MG/DL (ref 40–75)
HDLC SERPL: 26.3 % (ref 20–50)
LDLC SERPL CALC-MCNC: 92 MG/DL (ref 63–159)
NONHDLC SERPL-MCNC: 123 MG/DL
POTASSIUM SERPL-SCNC: 4.1 MMOL/L (ref 3.5–5.1)
SODIUM SERPL-SCNC: 142 MMOL/L (ref 136–145)
TRIGL SERPL-MCNC: 155 MG/DL (ref 30–150)

## 2024-12-20 PROCEDURE — 99213 OFFICE O/P EST LOW 20 MIN: CPT | Mod: HCNC,S$GLB,, | Performed by: FAMILY MEDICINE

## 2024-12-20 PROCEDURE — 3075F SYST BP GE 130 - 139MM HG: CPT | Mod: HCNC,CPTII,S$GLB, | Performed by: FAMILY MEDICINE

## 2024-12-20 PROCEDURE — 3061F NEG MICROALBUMINURIA REV: CPT | Mod: HCNC,CPTII,S$GLB, | Performed by: FAMILY MEDICINE

## 2024-12-20 PROCEDURE — 80061 LIPID PANEL: CPT | Mod: HCNC | Performed by: FAMILY MEDICINE

## 2024-12-20 PROCEDURE — 99999 PR PBB SHADOW E&M-EST. PATIENT-LVL III: CPT | Mod: PBBFAC,HCNC,, | Performed by: FAMILY MEDICINE

## 2024-12-20 PROCEDURE — 1101F PT FALLS ASSESS-DOCD LE1/YR: CPT | Mod: HCNC,CPTII,S$GLB, | Performed by: FAMILY MEDICINE

## 2024-12-20 PROCEDURE — 3079F DIAST BP 80-89 MM HG: CPT | Mod: HCNC,CPTII,S$GLB, | Performed by: FAMILY MEDICINE

## 2024-12-20 PROCEDURE — 80048 BASIC METABOLIC PNL TOTAL CA: CPT | Mod: HCNC | Performed by: FAMILY MEDICINE

## 2024-12-20 PROCEDURE — 4010F ACE/ARB THERAPY RXD/TAKEN: CPT | Mod: HCNC,CPTII,S$GLB, | Performed by: FAMILY MEDICINE

## 2024-12-20 PROCEDURE — 1126F AMNT PAIN NOTED NONE PRSNT: CPT | Mod: HCNC,CPTII,S$GLB, | Performed by: FAMILY MEDICINE

## 2024-12-20 PROCEDURE — 3288F FALL RISK ASSESSMENT DOCD: CPT | Mod: HCNC,CPTII,S$GLB, | Performed by: FAMILY MEDICINE

## 2024-12-20 PROCEDURE — 1159F MED LIST DOCD IN RCRD: CPT | Mod: HCNC,CPTII,S$GLB, | Performed by: FAMILY MEDICINE

## 2024-12-20 PROCEDURE — 3066F NEPHROPATHY DOC TX: CPT | Mod: HCNC,CPTII,S$GLB, | Performed by: FAMILY MEDICINE

## 2024-12-20 PROCEDURE — 3008F BODY MASS INDEX DOCD: CPT | Mod: HCNC,CPTII,S$GLB, | Performed by: FAMILY MEDICINE

## 2024-12-20 RX ORDER — LOSARTAN POTASSIUM 25 MG/1
25 TABLET ORAL DAILY
Qty: 30 TABLET | Refills: 1 | Status: SHIPPED | OUTPATIENT
Start: 2024-12-20

## 2024-12-20 NOTE — TELEPHONE ENCOUNTER
----- Message from Yahaira Bryson MD sent at 12/20/2024  4:00 PM CST -----  Please notify patient that results have returned, and they are normal. We can switch from lisinopril to losartan.  mh

## 2024-12-27 ENCOUNTER — TELEPHONE (OUTPATIENT)
Dept: FAMILY MEDICINE | Facility: CLINIC | Age: 74
End: 2024-12-27
Payer: MEDICARE

## 2024-12-28 NOTE — PROGRESS NOTES
Subjective:       Patient ID: Raffi Amaro is a 74 y.o. female.    Chief Complaint: Follow-up    History of Present Illness    Patient presents today with shortness of breath and a rash on the face. She is currently taking lisinopril 15 mg for blood pressure management. She reports fatigue and feeling run down. She developed a facial rash, described as dry and flaky, after starting blood pressure medication which is currently improving with lotion use. She also reports shortness of breath, particularly when bending down, accompanied by frequent nighttime dry cough. These symptoms began after starting blood pressure medication. She has expressed concerns about possible kidney issues as medication side effects. She reports urinary frequency and urgency.          Objective:          Physical Exam  Vitals and nursing note reviewed.   Constitutional:       General: She is not in acute distress.     Appearance: She is well-developed.   HENT:      Head: Normocephalic and atraumatic.   Eyes:      Conjunctiva/sclera: Conjunctivae normal.      Pupils: Pupils are equal, round, and reactive to light.   Neck:      Thyroid: No thyromegaly.   Cardiovascular:      Rate and Rhythm: Normal rate and regular rhythm.      Heart sounds: Normal heart sounds.   Pulmonary:      Effort: Pulmonary effort is normal. No respiratory distress.      Breath sounds: Normal breath sounds. No wheezing.   Abdominal:      General: Bowel sounds are normal.      Palpations: Abdomen is soft.      Tenderness: There is no abdominal tenderness.   Musculoskeletal:      Cervical back: Normal range of motion and neck supple.      Comments: Remains in boot   Lymphadenopathy:      Cervical: No cervical adenopathy.   Skin:     General: Skin is warm and dry.      Findings: No rash.   Neurological:      Mental Status: She is alert and oriented to person, place, and time.   Psychiatric:         Behavior: Behavior normal.         Assessment:           1.  "Hypertension, unspecified type        Plan:     Assessment & Plan    ESSENTIAL HYPERTENSION:  - Explained equivalency of lisinopril and losartan dosages (e.g., 25mg losartan  10mg lisinopril).  - Discussed ACE inhibitors (lisinopril) vs. ARBs (losartan) and their differing side effect profiles.  - Started: Losartan 25mg daily, beginning tomorrow.  - Discontinued: Lisinopril 15mg daily.  - Continued: Plavix.  - Blood work ordered to check kidney function.    OTHER CONDITIONS:  - Educated on freezing fish oil capsules to reduce GI side effects.    FOLLOW-UP:  - Follow up in 2 weeks.  - Patient to bring all current medications to next appointment.         Raffi Childs" was seen today for follow-up.    Diagnoses and all orders for this visit:    Hypertension, unspecified type  -     losartan (COZAAR) 25 MG tablet; Take 1 tablet (25 mg total) by mouth once daily.  -     Basic Metabolic Panel; Future  -     Lipid Panel; Future          RTC if condition acutely worsens or any other concerns, otherwise RTC as scheduled      This note was generated with the assistance of ambient listening technology. Verbal consent was obtained by the patient and accompanying visitor(s) for the recording of patient appointment to facilitate this note. I attest to having reviewed and edited the generated note for accuracy, though some syntax or spelling errors may persist. Please contact the author of this note for any clarification.   .deep    "

## 2025-01-02 ENCOUNTER — TELEPHONE (OUTPATIENT)
Dept: ORTHOPEDICS | Facility: CLINIC | Age: 75
End: 2025-01-02
Payer: MEDICARE

## 2025-01-02 ENCOUNTER — OFFICE VISIT (OUTPATIENT)
Dept: ORTHOPEDICS | Facility: CLINIC | Age: 75
End: 2025-01-02
Payer: MEDICARE

## 2025-01-02 ENCOUNTER — HOSPITAL ENCOUNTER (OUTPATIENT)
Dept: RADIOLOGY | Facility: HOSPITAL | Age: 75
Discharge: HOME | End: 2025-01-02
Attending: NURSE PRACTITIONER
Payer: MEDICARE

## 2025-01-02 VITALS
RESPIRATION RATE: 18 BRPM | OXYGEN SATURATION: 99 % | SYSTOLIC BLOOD PRESSURE: 130 MMHG | BODY MASS INDEX: 31.65 KG/M2 | HEART RATE: 80 BPM | WEIGHT: 190 LBS | HEIGHT: 65 IN | DIASTOLIC BLOOD PRESSURE: 76 MMHG

## 2025-01-02 DIAGNOSIS — M25.571 ACUTE RIGHT ANKLE PAIN: ICD-10-CM

## 2025-01-02 DIAGNOSIS — S82.831A CLOSED FRACTURE OF DISTAL END OF RIGHT FIBULA, UNSPECIFIED FRACTURE MORPHOLOGY, INITIAL ENCOUNTER: Primary | ICD-10-CM

## 2025-01-02 PROCEDURE — 3075F SYST BP GE 130 - 139MM HG: CPT | Mod: HCNC,CPTII,S$GLB, | Performed by: PHYSICIAN ASSISTANT

## 2025-01-02 PROCEDURE — 1160F RVW MEDS BY RX/DR IN RCRD: CPT | Mod: HCNC,CPTII,S$GLB, | Performed by: PHYSICIAN ASSISTANT

## 2025-01-02 PROCEDURE — 1126F AMNT PAIN NOTED NONE PRSNT: CPT | Mod: HCNC,CPTII,S$GLB, | Performed by: PHYSICIAN ASSISTANT

## 2025-01-02 PROCEDURE — 73610 X-RAY EXAM OF ANKLE: CPT | Mod: TC,HCNC,RT

## 2025-01-02 PROCEDURE — 1159F MED LIST DOCD IN RCRD: CPT | Mod: HCNC,CPTII,S$GLB, | Performed by: PHYSICIAN ASSISTANT

## 2025-01-02 PROCEDURE — 3008F BODY MASS INDEX DOCD: CPT | Mod: HCNC,CPTII,S$GLB, | Performed by: PHYSICIAN ASSISTANT

## 2025-01-02 PROCEDURE — 73610 X-RAY EXAM OF ANKLE: CPT | Mod: 26,HCNC,RT, | Performed by: RADIOLOGY

## 2025-01-02 PROCEDURE — 99213 OFFICE O/P EST LOW 20 MIN: CPT | Mod: HCNC,S$GLB,, | Performed by: PHYSICIAN ASSISTANT

## 2025-01-02 PROCEDURE — 99999 PR PBB SHADOW E&M-EST. PATIENT-LVL IV: CPT | Mod: PBBFAC,HCNC,, | Performed by: PHYSICIAN ASSISTANT

## 2025-01-02 PROCEDURE — 3078F DIAST BP <80 MM HG: CPT | Mod: HCNC,CPTII,S$GLB, | Performed by: PHYSICIAN ASSISTANT

## 2025-01-02 NOTE — PROGRESS NOTES
Subjective:      Patient ID: Raffi Amaro is a 74 y.o. female.    Chief Complaint: Pain of the Right Ankle and Fracture    Review of patient's allergies indicates:   Allergen Reactions    Aspirin       PT returns to clinic for follow up of right distal fibula fracture.  She reports no pain to ankle.    12/11/24:  Pt returns to clinic for follow up of right distal fibula fracture.  She reports that pain and swelling have completely improved.  She has not been wearing boot when walking around her house.    11/19/24:  75 yo F presents to clinic with right distal fibula fracture.  She tripped over her cat 5 days ago and fell, twisting her ankle.  Seen in ED day after injury and diagnosed with distal fibula fracture, was placed in CAM boot, using crutches.  She reports mild pain and swelling to lateral ankle.  Taking norco as prescribed by ED as needed.        Review of Systems   Constitutional: Negative for chills, diaphoresis and fever.   HENT:  Negative for congestion, ear discharge and ear pain.    Eyes:  Negative for blurred vision, discharge, double vision and pain.   Cardiovascular:  Negative for chest pain, claudication and cyanosis.   Respiratory:  Negative for cough, hemoptysis and shortness of breath.    Endocrine: Negative for cold intolerance and heat intolerance.   Skin:  Negative for color change, dry skin, itching and rash.   Musculoskeletal:  Positive for joint pain and joint swelling. Negative for arthritis, back pain, falls, gout, muscle weakness and neck pain.   Gastrointestinal:  Negative for abdominal pain and change in bowel habit.   Neurological:  Negative for brief paralysis, disturbances in coordination, dizziness, numbness and paresthesias.   Psychiatric/Behavioral:  Negative for altered mental status and depression.          Objective:          General    Constitutional: She is oriented to person, place, and time. She appears well-developed and well-nourished. No distress.   HENT:   Head:  Atraumatic.   Eyes: EOM are normal. Right eye exhibits no discharge. Left eye exhibits no discharge.   Cardiovascular:  Normal rate.            Pulmonary/Chest: Effort normal. No respiratory distress.   Abdominal: Soft.   Neurological: She is alert and oriented to person, place, and time.   Psychiatric: She has a normal mood and affect. Her behavior is normal.         Right Ankle/Foot Exam     Inspection   Scars: absent  Deformity: absent  Erythema: absent  Bruising: Ankle - absent Foot - absent    Range of Motion   The patient has normal right ankle ROM.  Navarro Test:  negative  First MTP Joint: normal    Other   Sensation: normal    Comments:  No pain or swelling    Left Ankle/Foot Exam     Inspection  Deformity: absent  Erythema: absent  Bruising: Ankle - absent Foot - absent  Scars: absent    Range of Motion   The patient has normal left ankle ROM.   Navarro Test:  normal  First MTP Joint: normal    Other   Sensation: normal      Vascular Exam       Edema  Right Lower Leg: absent  Left Lower Leg: absent              Assessment:         Xray Right Ankle 1/2/25:  Healing nondisplaced fracture of the lateral malleolus with decreased visualization of the fracture lucency. No new displaced fractures. No suspicious lytic or blastic lesions. Mild degenerative spurring along the anterior and posterior aspects of the tibial plafond. Tibiotalar, subtalar and midtarsal cartilage spaces are preserved. Questionable minimal subchondral sclerosis at the medial aspect of the talar dome, likely degenerative. Ankle mortise is symmetric. Improved subcutaneous edema overlying the lateral malleolus. No radiopaque foreign bodies.     Xray Right Ankle 11/19/24:  Nondisplaced fracture of the right lateral malleolus with soft tissue edema       Encounter Diagnosis   Name Primary?    Closed fracture of distal end of right fibula, unspecified fracture morphology, initial encounter Yes        Closed fracture of distal end of right  fibula, unspecified fracture morphology, initial encounter                   Plan:         We discussed diagnosis and treatment options.    PT will discontinue boot and RTC in 3 weeks for follow up with repeat xrays, sooner if needed.    Patient voices understanding of and agreement with treatment plan. All of the patient's questions were answered, and the patient will contact us if she has any questions or concerns in the interim.

## 2025-01-07 ENCOUNTER — OFFICE VISIT (OUTPATIENT)
Dept: FAMILY MEDICINE | Facility: CLINIC | Age: 75
End: 2025-01-07
Payer: MEDICARE

## 2025-01-07 VITALS
BODY MASS INDEX: 32.3 KG/M2 | HEIGHT: 65 IN | SYSTOLIC BLOOD PRESSURE: 126 MMHG | WEIGHT: 193.88 LBS | HEART RATE: 67 BPM | DIASTOLIC BLOOD PRESSURE: 70 MMHG | OXYGEN SATURATION: 98 % | RESPIRATION RATE: 18 BRPM

## 2025-01-07 DIAGNOSIS — G45.9 TIA (TRANSIENT ISCHEMIC ATTACK): Primary | ICD-10-CM

## 2025-01-07 DIAGNOSIS — I47.20 VENTRICULAR TACHYCARDIA: ICD-10-CM

## 2025-01-07 DIAGNOSIS — Z53.20 REFUSAL OF STATIN MEDICATION BY PATIENT: ICD-10-CM

## 2025-01-07 DIAGNOSIS — I10 PRIMARY HYPERTENSION: ICD-10-CM

## 2025-01-07 DIAGNOSIS — N18.31 CHRONIC KIDNEY DISEASE, STAGE 3A: ICD-10-CM

## 2025-01-07 PROCEDURE — 1126F AMNT PAIN NOTED NONE PRSNT: CPT | Mod: HCNC,CPTII,S$GLB, | Performed by: FAMILY MEDICINE

## 2025-01-07 PROCEDURE — 3078F DIAST BP <80 MM HG: CPT | Mod: HCNC,CPTII,S$GLB, | Performed by: FAMILY MEDICINE

## 2025-01-07 PROCEDURE — 1101F PT FALLS ASSESS-DOCD LE1/YR: CPT | Mod: HCNC,CPTII,S$GLB, | Performed by: FAMILY MEDICINE

## 2025-01-07 PROCEDURE — 3288F FALL RISK ASSESSMENT DOCD: CPT | Mod: HCNC,CPTII,S$GLB, | Performed by: FAMILY MEDICINE

## 2025-01-07 PROCEDURE — 3008F BODY MASS INDEX DOCD: CPT | Mod: HCNC,CPTII,S$GLB, | Performed by: FAMILY MEDICINE

## 2025-01-07 PROCEDURE — 3074F SYST BP LT 130 MM HG: CPT | Mod: HCNC,CPTII,S$GLB, | Performed by: FAMILY MEDICINE

## 2025-01-07 PROCEDURE — 99213 OFFICE O/P EST LOW 20 MIN: CPT | Mod: HCNC,S$GLB,, | Performed by: FAMILY MEDICINE

## 2025-01-07 PROCEDURE — 1159F MED LIST DOCD IN RCRD: CPT | Mod: HCNC,CPTII,S$GLB, | Performed by: FAMILY MEDICINE

## 2025-01-07 PROCEDURE — 99999 PR PBB SHADOW E&M-EST. PATIENT-LVL III: CPT | Mod: PBBFAC,HCNC,, | Performed by: FAMILY MEDICINE

## 2025-01-08 NOTE — PROGRESS NOTES
Subjective:       Patient ID: Raffi Amaro is a 74 y.o. female.    Chief Complaint: Follow-up    History of Present Illness    Patient presents today for follow-up. She reports a mild intermittent hacky cough, improved from previous severe cough with use of Zikan and Vicks VapoRub applied to feet. The rash around her eye is clearing up but has not completely resolved. She takes Losartan and Plavix daily with good compliance. She also takes bone health supplements, iron, vitamin D, and multivitamin supplements but reports occasional missed doses due to storage location inconvenience.          Objective:          Physical Exam  Vitals and nursing note reviewed.   Constitutional:       General: She is not in acute distress.     Appearance: She is well-developed.   HENT:      Head: Normocephalic and atraumatic.   Eyes:      Conjunctiva/sclera: Conjunctivae normal.      Pupils: Pupils are equal, round, and reactive to light.   Neck:      Thyroid: No thyromegaly.   Cardiovascular:      Rate and Rhythm: Normal rate and regular rhythm.      Heart sounds: Normal heart sounds.   Pulmonary:      Effort: Pulmonary effort is normal. No respiratory distress.      Breath sounds: Normal breath sounds. No wheezing.   Abdominal:      General: Bowel sounds are normal.      Palpations: Abdomen is soft.      Tenderness: There is no abdominal tenderness.   Musculoskeletal:         General: Normal range of motion.      Cervical back: Normal range of motion and neck supple.   Lymphadenopathy:      Cervical: No cervical adenopathy.   Skin:     General: Skin is warm and dry.      Findings: No rash.   Neurological:      Mental Status: She is alert and oriented to person, place, and time.      Comments: forgetful   Psychiatric:         Behavior: Behavior normal.         Assessment:           1. TIA (transient ischemic attack)    2. Primary hypertension    3. Refusal of statin medication by patient    4. Chronic kidney disease, stage 3a   "  5. Ventricular tachycardia        Plan:     Assessment & Plan    VENTRICULAR TACHYCARDIA:  - Prescribed Plavix to be taken daily.    CHRONIC KIDNEY DISEASE, STAGE 3A:  - Scheduled tests for the patient, with results expected on Friday.  - Plan to review results and schedule follow-up every 6 months if normal.  - Patient reports feeling good with blood-related issues.  - Prescribed Losartan and vitamins (for bone health, iron, and vitamin D) to be taken daily.  - Acknowledged the patient's improvement.    HYPERTENSION:  - Blood pressure monitored and evaluated as good.    MOBILITY ISSUES:  - Patient has a handicap placard for parking.  - Improvement in mobility observed since breaking leg.  - Assessed need for handicap placard renewal.  - Patient declined physical therapy after leg injury.    EDEMA:  - Patient reports improvement in edema (referred to as 'puddles').    RASH:  - Patient reports improvement in rash around eye.    COUGH:  - Patient reports severe cough that has now improved.  - Evaluated as mostly resolved, with only occasional mild symptoms remaining.  - Patient self-treated with Zicam and Vicks VapoRub.    NUTRITION:  - Explained importance of natural vitamin intake through balanced diet rich in fruits and vegetables.         Raffi Childs" was seen today for follow-up.    Diagnoses and all orders for this visit:    TIA (transient ischemic attack)    Primary hypertension    Refusal of statin medication by patient    Chronic kidney disease, stage 3a    Ventricular tachycardia          RTC if condition acutely worsens or any other concerns, otherwise RTC as scheduled      This note was generated with the assistance of ambient listening technology. Verbal consent was obtained by the patient and accompanying visitor(s) for the recording of patient appointment to facilitate this note. I attest to having reviewed and edited the generated note for accuracy, though some syntax or spelling errors may persist. " Please contact the author of this note for any clarification.   .deep

## 2025-01-10 ENCOUNTER — TELEPHONE (OUTPATIENT)
Dept: FAMILY MEDICINE | Facility: CLINIC | Age: 75
End: 2025-01-10
Payer: MEDICARE

## 2025-01-10 NOTE — TELEPHONE ENCOUNTER
----- Message from Ivan sent at 1/10/2025  8:26 AM CST -----  Contact: Patient  Raffi Amaro  MRN: [id number]  : 1950  PCP: Yahaira Bryson  Home Phone      [phone]  Work Phone      Not on file.  Mobile          [phone]      MESSAGE: requesting to speak with nurse Re: test received    Call [phone]    PCP: [place]

## 2025-01-10 NOTE — TELEPHONE ENCOUNTER
Asking if we received anything from Dr Kaiser's office. Notified that we have not received anything yet and when we do we will let her know. She v/u

## 2025-01-27 ENCOUNTER — TELEPHONE (OUTPATIENT)
Dept: ORTHOPEDICS | Facility: CLINIC | Age: 75
End: 2025-01-27
Payer: MEDICARE

## 2025-01-27 NOTE — TELEPHONE ENCOUNTER
----- Message from Luzmaria sent at 2025  4:38 PM CST -----  Contact: PATIENT  Raffi Amaro  MRN: [id number]  : 1950  PCP: Yahaira Bryson  Home Phone      [phone]  Work Phone      Not on file.  Mobile          [phone]      MESSAGE: Patient is needing to reschedule the appointment that she had today due to the weather.          Phone: [phone]

## 2025-02-03 ENCOUNTER — TELEPHONE (OUTPATIENT)
Dept: FAMILY MEDICINE | Facility: CLINIC | Age: 75
End: 2025-02-03
Payer: MEDICARE

## 2025-02-03 NOTE — TELEPHONE ENCOUNTER
----- Message from Ivan sent at 2/3/2025 12:31 PM CST -----  Contact: Patient  Raffi Amaro  MRN: [id number]  : 1950  PCP: Yahaira Bryosn  Home Phone      [phone]  Work Phone      Not on file.  Mobile          [phone]      MESSAGE: have records been received from Dr Kaiser -- please advise    Call [phone]    PCP: [place]

## 2025-02-03 NOTE — TELEPHONE ENCOUNTER
Patient contacted and notified that we did receive paperwork. She does have a follow up appointment with them romorrow.

## 2025-02-04 ENCOUNTER — HOSPITAL ENCOUNTER (OUTPATIENT)
Dept: RADIOLOGY | Facility: HOSPITAL | Age: 75
Discharge: HOME | End: 2025-02-04
Attending: PHYSICIAN ASSISTANT
Payer: MEDICARE

## 2025-02-04 ENCOUNTER — OFFICE VISIT (OUTPATIENT)
Dept: ORTHOPEDICS | Facility: CLINIC | Age: 75
End: 2025-02-04
Payer: MEDICARE

## 2025-02-04 VITALS
WEIGHT: 194.69 LBS | OXYGEN SATURATION: 99 % | HEART RATE: 78 BPM | BODY MASS INDEX: 32.44 KG/M2 | DIASTOLIC BLOOD PRESSURE: 86 MMHG | RESPIRATION RATE: 16 BRPM | HEIGHT: 65 IN | SYSTOLIC BLOOD PRESSURE: 130 MMHG

## 2025-02-04 DIAGNOSIS — S82.831A CLOSED FRACTURE OF DISTAL END OF RIGHT FIBULA, UNSPECIFIED FRACTURE MORPHOLOGY, INITIAL ENCOUNTER: ICD-10-CM

## 2025-02-04 DIAGNOSIS — S82.831D CLOSED FRACTURE OF DISTAL END OF RIGHT FIBULA WITH ROUTINE HEALING, UNSPECIFIED FRACTURE MORPHOLOGY, SUBSEQUENT ENCOUNTER: Primary | ICD-10-CM

## 2025-02-04 PROCEDURE — 1159F MED LIST DOCD IN RCRD: CPT | Mod: HCNC,CPTII,S$GLB, | Performed by: PHYSICIAN ASSISTANT

## 2025-02-04 PROCEDURE — 99999 PR PBB SHADOW E&M-EST. PATIENT-LVL III: CPT | Mod: PBBFAC,HCNC,, | Performed by: PHYSICIAN ASSISTANT

## 2025-02-04 PROCEDURE — 73610 X-RAY EXAM OF ANKLE: CPT | Mod: TC,HCNC,RT

## 2025-02-04 PROCEDURE — 99213 OFFICE O/P EST LOW 20 MIN: CPT | Mod: HCNC,S$GLB,, | Performed by: PHYSICIAN ASSISTANT

## 2025-02-04 PROCEDURE — 3075F SYST BP GE 130 - 139MM HG: CPT | Mod: HCNC,CPTII,S$GLB, | Performed by: PHYSICIAN ASSISTANT

## 2025-02-04 PROCEDURE — 3079F DIAST BP 80-89 MM HG: CPT | Mod: HCNC,CPTII,S$GLB, | Performed by: PHYSICIAN ASSISTANT

## 2025-02-04 PROCEDURE — 73610 X-RAY EXAM OF ANKLE: CPT | Mod: 26,HCNC,RT, | Performed by: RADIOLOGY

## 2025-02-04 PROCEDURE — 1125F AMNT PAIN NOTED PAIN PRSNT: CPT | Mod: HCNC,CPTII,S$GLB, | Performed by: PHYSICIAN ASSISTANT

## 2025-02-04 PROCEDURE — 3008F BODY MASS INDEX DOCD: CPT | Mod: HCNC,CPTII,S$GLB, | Performed by: PHYSICIAN ASSISTANT

## 2025-02-04 PROCEDURE — 1160F RVW MEDS BY RX/DR IN RCRD: CPT | Mod: HCNC,CPTII,S$GLB, | Performed by: PHYSICIAN ASSISTANT

## 2025-02-04 NOTE — PROGRESS NOTES
Subjective:      Patient ID: Raffi Amaro is a 74 y.o. female.    Chief Complaint: Pain of the Right Foot    Review of patient's allergies indicates:   Allergen Reactions    Aspirin         PT returns to clinic for follow up of right distal fibula fracture.  She reports no pain to ankle.    12/11/24:  Pt returns to clinic for follow up of right distal fibula fracture.  She reports that pain and swelling have completely improved.  She has not been wearing boot when walking around her house.    11/19/24:  75 yo F presents to clinic with right distal fibula fracture.  She tripped over her cat 5 days ago and fell, twisting her ankle.  Seen in ED day after injury and diagnosed with distal fibula fracture, was placed in CAM boot, using crutches.  She reports mild pain and swelling to lateral ankle.  Taking norco as prescribed by ED as needed.        Review of Systems   Constitutional: Negative for chills, diaphoresis and fever.   HENT:  Negative for congestion, ear discharge and ear pain.    Eyes:  Negative for blurred vision, discharge, double vision and pain.   Cardiovascular:  Negative for chest pain, claudication and cyanosis.   Respiratory:  Negative for cough, hemoptysis and shortness of breath.    Endocrine: Negative for cold intolerance and heat intolerance.   Skin:  Negative for color change, dry skin, itching and rash.   Musculoskeletal:  Positive for joint pain and joint swelling. Negative for arthritis, back pain, falls, gout, muscle weakness and neck pain.   Gastrointestinal:  Negative for abdominal pain and change in bowel habit.   Neurological:  Negative for brief paralysis, disturbances in coordination, dizziness, numbness and paresthesias.   Psychiatric/Behavioral:  Negative for altered mental status and depression.          Objective:          General    Constitutional: She is oriented to person, place, and time. She appears well-developed and well-nourished. No distress.   HENT:   Head: Atraumatic.    Eyes: EOM are normal. Right eye exhibits no discharge. Left eye exhibits no discharge.   Cardiovascular:  Normal rate.            Pulmonary/Chest: Effort normal. No respiratory distress.   Abdominal: Soft.   Neurological: She is alert and oriented to person, place, and time.   Psychiatric: She has a normal mood and affect. Her behavior is normal.         Right Ankle/Foot Exam     Inspection   Scars: absent  Deformity: absent  Erythema: absent  Bruising: Ankle - absent Foot - absent    Range of Motion   The patient has normal right ankle ROM.  Navarro Test:  negative  First MTP Joint: normal    Other   Sensation: normal    Comments:  No pain or swelling    Left Ankle/Foot Exam     Inspection  Deformity: absent  Erythema: absent  Bruising: Ankle - absent Foot - absent  Scars: absent    Range of Motion   The patient has normal left ankle ROM.   Navarro Test:  normal  First MTP Joint: normal    Other   Sensation: normal      Vascular Exam       Edema  Right Lower Leg: absent  Left Lower Leg: absent              Assessment:           Xray Right Ankle 2/4/25:  Healing right lateral malleolar fracture without change in alignment.     Xray Right Ankle 1/2/25:  Healing nondisplaced fracture of the lateral malleolus with decreased visualization of the fracture lucency. No new displaced fractures. No suspicious lytic or blastic lesions. Mild degenerative spurring along the anterior and posterior aspects of the tibial plafond. Tibiotalar, subtalar and midtarsal cartilage spaces are preserved. Questionable minimal subchondral sclerosis at the medial aspect of the talar dome, likely degenerative. Ankle mortise is symmetric. Improved subcutaneous edema overlying the lateral malleolus. No radiopaque foreign bodies.     Xray Right Ankle 11/19/24:  Nondisplaced fracture of the right lateral malleolus with soft tissue edema       Encounter Diagnosis   Name Primary?    Closed fracture of distal end of right fibula with routine  healing, unspecified fracture morphology, subsequent encounter Yes          Closed fracture of distal end of right fibula with routine healing, unspecified fracture morphology, subsequent encounter                     Plan:         We discussed diagnosis and treatment plan.    Pt will return to clinic in 1-2 months with repeat xrays, sooner if needed.    Patient voices understanding of and agreement with treatment plan. All of the patient's questions were answered, and the patient will contact us if she has any questions or concerns in the interim.

## 2025-02-05 DIAGNOSIS — R52 PAIN: Primary | ICD-10-CM

## 2025-02-21 DIAGNOSIS — Z00.00 ENCOUNTER FOR MEDICARE ANNUAL WELLNESS EXAM: ICD-10-CM

## 2025-04-08 ENCOUNTER — OFFICE VISIT (OUTPATIENT)
Dept: ORTHOPEDICS | Facility: CLINIC | Age: 75
End: 2025-04-08
Payer: MEDICARE

## 2025-04-08 ENCOUNTER — HOSPITAL ENCOUNTER (OUTPATIENT)
Dept: RADIOLOGY | Facility: HOSPITAL | Age: 75
Discharge: HOME | End: 2025-04-08
Attending: PHYSICIAN ASSISTANT
Payer: MEDICARE

## 2025-04-08 VITALS
SYSTOLIC BLOOD PRESSURE: 128 MMHG | BODY MASS INDEX: 32.65 KG/M2 | WEIGHT: 196 LBS | HEIGHT: 65 IN | OXYGEN SATURATION: 98 % | HEART RATE: 77 BPM | DIASTOLIC BLOOD PRESSURE: 84 MMHG | RESPIRATION RATE: 16 BRPM

## 2025-04-08 DIAGNOSIS — S82.831D CLOSED FRACTURE OF DISTAL END OF RIGHT FIBULA WITH ROUTINE HEALING, UNSPECIFIED FRACTURE MORPHOLOGY, SUBSEQUENT ENCOUNTER: Primary | ICD-10-CM

## 2025-04-08 DIAGNOSIS — R52 PAIN: ICD-10-CM

## 2025-04-08 PROCEDURE — 73630 X-RAY EXAM OF FOOT: CPT | Mod: 26,HCNC,RT, | Performed by: RADIOLOGY

## 2025-04-08 PROCEDURE — 1126F AMNT PAIN NOTED NONE PRSNT: CPT | Mod: HCNC,CPTII,S$GLB, | Performed by: PHYSICIAN ASSISTANT

## 2025-04-08 PROCEDURE — 99213 OFFICE O/P EST LOW 20 MIN: CPT | Mod: HCNC,S$GLB,, | Performed by: PHYSICIAN ASSISTANT

## 2025-04-08 PROCEDURE — 3074F SYST BP LT 130 MM HG: CPT | Mod: HCNC,CPTII,S$GLB, | Performed by: PHYSICIAN ASSISTANT

## 2025-04-08 PROCEDURE — 99999 PR PBB SHADOW E&M-EST. PATIENT-LVL III: CPT | Mod: PBBFAC,HCNC,, | Performed by: PHYSICIAN ASSISTANT

## 2025-04-08 PROCEDURE — 3079F DIAST BP 80-89 MM HG: CPT | Mod: HCNC,CPTII,S$GLB, | Performed by: PHYSICIAN ASSISTANT

## 2025-04-08 PROCEDURE — 1159F MED LIST DOCD IN RCRD: CPT | Mod: HCNC,CPTII,S$GLB, | Performed by: PHYSICIAN ASSISTANT

## 2025-04-08 PROCEDURE — 73630 X-RAY EXAM OF FOOT: CPT | Mod: TC,HCNC,RT

## 2025-04-08 PROCEDURE — 3008F BODY MASS INDEX DOCD: CPT | Mod: HCNC,CPTII,S$GLB, | Performed by: PHYSICIAN ASSISTANT

## 2025-04-08 PROCEDURE — 1160F RVW MEDS BY RX/DR IN RCRD: CPT | Mod: HCNC,CPTII,S$GLB, | Performed by: PHYSICIAN ASSISTANT

## 2025-04-08 NOTE — PROGRESS NOTES
Subjective:      Patient ID: Raffi Amaro is a 75 y.o. female.    Chief Complaint: Pain of the Right Ankle    Review of patient's allergies indicates:   Allergen Reactions    Aspirin       Pt returns to clinic for follow up of right distal fibula fracture.  All symptoms are improved.  No complaints today.    2/4/25:  PT returns to clinic for follow up of right distal fibula fracture.  She reports no pain to ankle.    12/11/24:  Pt returns to clinic for follow up of right distal fibula fracture.  She reports that pain and swelling have completely improved.  She has not been wearing boot when walking around her house.    11/19/24:  75 yo F presents to clinic with right distal fibula fracture.  She tripped over her cat 5 days ago and fell, twisting her ankle.  Seen in ED day after injury and diagnosed with distal fibula fracture, was placed in CAM boot, using crutches.  She reports mild pain and swelling to lateral ankle.  Taking norco as prescribed by ED as needed.        Review of Systems   Constitutional: Negative for chills, diaphoresis and fever.   HENT:  Negative for congestion, ear discharge and ear pain.    Eyes:  Negative for blurred vision, discharge, double vision and pain.   Cardiovascular:  Negative for chest pain, claudication and cyanosis.   Respiratory:  Negative for cough, hemoptysis and shortness of breath.    Endocrine: Negative for cold intolerance and heat intolerance.   Skin:  Negative for color change, dry skin, itching and rash.   Musculoskeletal:  Positive for joint pain and joint swelling. Negative for arthritis, back pain, falls, gout, muscle weakness and neck pain.   Gastrointestinal:  Negative for abdominal pain and change in bowel habit.   Neurological:  Negative for brief paralysis, disturbances in coordination, dizziness, numbness and paresthesias.   Psychiatric/Behavioral:  Negative for altered mental status and depression.          Objective:          General    Constitutional: She  is oriented to person, place, and time. She appears well-developed and well-nourished. No distress.   HENT:   Head: Atraumatic.   Eyes: EOM are normal. Right eye exhibits no discharge. Left eye exhibits no discharge.   Cardiovascular:  Normal rate.            Pulmonary/Chest: Effort normal. No respiratory distress.   Abdominal: Soft.   Neurological: She is alert and oriented to person, place, and time.   Psychiatric: She has a normal mood and affect. Her behavior is normal.         Right Ankle/Foot Exam     Inspection   Scars: absent  Deformity: absent  Erythema: absent  Bruising: Ankle - absent Foot - absent    Range of Motion   The patient has normal right ankle ROM.  Navarro Test:  negative  First MTP Joint: normal    Other   Sensation: normal    Comments:  No pain, swelling, or tenderness    Left Ankle/Foot Exam     Inspection  Deformity: absent  Erythema: absent  Bruising: Ankle - absent Foot - absent  Scars: absent    Range of Motion   The patient has normal left ankle ROM.   Navarro Test:  normal  First MTP Joint: normal    Other   Sensation: normal      Vascular Exam       Edema  Right Lower Leg: absent  Left Lower Leg: absent              Assessment:           Xray Right Ankle 2/4/25:  Healing right lateral malleolar fracture without change in alignment.     Xray Right Ankle 1/2/25:  Healing nondisplaced fracture of the lateral malleolus with decreased visualization of the fracture lucency. No new displaced fractures. No suspicious lytic or blastic lesions. Mild degenerative spurring along the anterior and posterior aspects of the tibial plafond. Tibiotalar, subtalar and midtarsal cartilage spaces are preserved. Questionable minimal subchondral sclerosis at the medial aspect of the talar dome, likely degenerative. Ankle mortise is symmetric. Improved subcutaneous edema overlying the lateral malleolus. No radiopaque foreign bodies.     Xray Right Ankle 11/19/24:  Nondisplaced fracture of the right lateral  malleolus with soft tissue edema       Encounter Diagnosis   Name Primary?    Closed fracture of distal end of right fibula with routine healing, unspecified fracture morphology, subsequent encounter Yes            Closed fracture of distal end of right fibula with routine healing, unspecified fracture morphology, subsequent encounter                       Plan:         We discussed diagnosis and treatment plan.    Pt will return to clinic in 1-2 months with repeat xrays, sooner if needed.    Patient voices understanding of and agreement with treatment plan. All of the patient's questions were answered, and the patient will contact us if she has any questions or concerns in the interim.

## 2025-05-21 ENCOUNTER — CLINICAL SUPPORT (OUTPATIENT)
Dept: FAMILY MEDICINE | Facility: CLINIC | Age: 75
End: 2025-05-21
Payer: MEDICARE

## 2025-05-21 ENCOUNTER — OFFICE VISIT (OUTPATIENT)
Dept: FAMILY MEDICINE | Facility: CLINIC | Age: 75
End: 2025-05-21
Payer: MEDICARE

## 2025-05-21 VITALS
BODY MASS INDEX: 32.95 KG/M2 | OXYGEN SATURATION: 99 % | HEIGHT: 65 IN | RESPIRATION RATE: 16 BRPM | HEART RATE: 78 BPM | SYSTOLIC BLOOD PRESSURE: 132 MMHG | DIASTOLIC BLOOD PRESSURE: 88 MMHG | WEIGHT: 197.75 LBS

## 2025-05-21 DIAGNOSIS — J02.9 VIRAL PHARYNGITIS: ICD-10-CM

## 2025-05-21 DIAGNOSIS — F51.01 PRIMARY INSOMNIA: ICD-10-CM

## 2025-05-21 DIAGNOSIS — J02.9 SORE THROAT: Primary | ICD-10-CM

## 2025-05-21 DIAGNOSIS — J02.9 SORE THROAT: ICD-10-CM

## 2025-05-21 LAB
CTP QC/QA: YES
S PYO RRNA THROAT QL PROBE: NEGATIVE

## 2025-05-21 PROCEDURE — 99214 OFFICE O/P EST MOD 30 MIN: CPT | Mod: HCNC,S$GLB,, | Performed by: NURSE PRACTITIONER

## 2025-05-21 PROCEDURE — 3079F DIAST BP 80-89 MM HG: CPT | Mod: CPTII,HCNC,S$GLB, | Performed by: NURSE PRACTITIONER

## 2025-05-21 PROCEDURE — 1101F PT FALLS ASSESS-DOCD LE1/YR: CPT | Mod: CPTII,HCNC,S$GLB, | Performed by: NURSE PRACTITIONER

## 2025-05-21 PROCEDURE — 3075F SYST BP GE 130 - 139MM HG: CPT | Mod: CPTII,HCNC,S$GLB, | Performed by: NURSE PRACTITIONER

## 2025-05-21 PROCEDURE — 1160F RVW MEDS BY RX/DR IN RCRD: CPT | Mod: CPTII,HCNC,S$GLB, | Performed by: NURSE PRACTITIONER

## 2025-05-21 PROCEDURE — 3288F FALL RISK ASSESSMENT DOCD: CPT | Mod: CPTII,HCNC,S$GLB, | Performed by: NURSE PRACTITIONER

## 2025-05-21 PROCEDURE — 3008F BODY MASS INDEX DOCD: CPT | Mod: CPTII,HCNC,S$GLB, | Performed by: NURSE PRACTITIONER

## 2025-05-21 PROCEDURE — 1126F AMNT PAIN NOTED NONE PRSNT: CPT | Mod: CPTII,HCNC,S$GLB, | Performed by: NURSE PRACTITIONER

## 2025-05-21 PROCEDURE — 1159F MED LIST DOCD IN RCRD: CPT | Mod: CPTII,HCNC,S$GLB, | Performed by: NURSE PRACTITIONER

## 2025-05-21 PROCEDURE — 99999 PR PBB SHADOW E&M-EST. PATIENT-LVL III: CPT | Mod: PBBFAC,HCNC,, | Performed by: NURSE PRACTITIONER

## 2025-05-21 RX ORDER — HYDROXYZINE PAMOATE 25 MG/1
25 CAPSULE ORAL NIGHTLY PRN
Qty: 30 CAPSULE | Refills: 2 | Status: SHIPPED | OUTPATIENT
Start: 2025-05-21

## 2025-05-21 RX ORDER — NAPROXEN SODIUM 220 MG/1
81 TABLET, FILM COATED ORAL DAILY
COMMUNITY

## 2025-05-21 NOTE — PROGRESS NOTES
Subjective:       Patient ID: Raffi Amaro is a 75 y.o. female.    Chief Complaint: Sore Throat (Symptoms started x 3 days ago. No fever. ), Fatigue, and Insomnia (Has been struggling with sleeping)    Here with sore throat that started 3 days ago. Fatigue and sleeplessness.    Sore Throat   The current episode started in the past 7 days. There has been no fever. Pertinent negatives include no abdominal pain, congestion, coughing, diarrhea, ear pain, headaches, shortness of breath or vomiting.   Fatigue  Associated symptoms include fatigue and a sore throat. Pertinent negatives include no abdominal pain, arthralgias, congestion, coughing, fever, headaches, myalgias, nausea or vomiting.     Review of Systems   Constitutional:  Positive for fatigue. Negative for appetite change and fever.   HENT:  Positive for sore throat. Negative for congestion and ear pain.    Eyes: Negative.  Negative for visual disturbance.   Respiratory: Negative.  Negative for cough, shortness of breath and wheezing.    Cardiovascular: Negative.    Gastrointestinal: Negative.  Negative for abdominal pain, diarrhea, nausea and vomiting.   Endocrine: Negative.    Genitourinary: Negative.  Negative for difficulty urinating and urgency.   Musculoskeletal: Negative.  Negative for arthralgias and myalgias.   Skin: Negative.  Negative for color change.   Allergic/Immunologic: Negative.    Neurological: Negative.  Negative for dizziness and headaches.   Hematological: Negative.    Psychiatric/Behavioral: Negative.  Negative for sleep disturbance.    All other systems reviewed and are negative.      Objective:      Physical Exam  Vitals and nursing note reviewed.   Constitutional:       General: She is not in acute distress.     Appearance: Normal appearance. She is well-developed.   HENT:      Head: Normocephalic and atraumatic.      Right Ear: Tympanic membrane and external ear normal.      Left Ear: Tympanic membrane and external ear normal.       Nose: Nose normal.      Mouth/Throat:      Mouth: Mucous membranes are moist.      Pharynx: Posterior oropharyngeal erythema present.   Eyes:      Conjunctiva/sclera: Conjunctivae normal.      Pupils: Pupils are equal, round, and reactive to light.   Cardiovascular:      Rate and Rhythm: Normal rate.      Pulses: Normal pulses.      Heart sounds: Normal heart sounds. No murmur heard.  Pulmonary:      Effort: Pulmonary effort is normal. No respiratory distress.      Breath sounds: Normal breath sounds.   Abdominal:      Palpations: Abdomen is soft.   Musculoskeletal:         General: Normal range of motion.      Cervical back: Normal range of motion and neck supple.   Lymphadenopathy:      Cervical: No cervical adenopathy.   Skin:     General: Skin is warm and dry.   Neurological:      Mental Status: She is alert and oriented to person, place, and time.   Psychiatric:         Mood and Affect: Mood normal.         Assessment:       1. Sore throat    2. Viral pharyngitis    3. Primary insomnia        Plan:     1. Sore throat  -     POCT rapid strep A; Future; Expected date: 05/21/2025    2. Viral pharyngitis    3. Primary insomnia  -     hydrOXYzine pamoate (VISTARIL) 25 MG Cap; Take 1 capsule (25 mg total) by mouth nightly as needed (insomnia).  Dispense: 30 capsule; Refill: 2     RTC PRN - education given

## 2025-06-05 ENCOUNTER — OFFICE VISIT (OUTPATIENT)
Dept: FAMILY MEDICINE | Facility: CLINIC | Age: 75
End: 2025-06-05
Payer: MEDICARE

## 2025-06-05 VITALS
OXYGEN SATURATION: 99 % | DIASTOLIC BLOOD PRESSURE: 80 MMHG | WEIGHT: 198.75 LBS | HEART RATE: 76 BPM | HEIGHT: 65 IN | SYSTOLIC BLOOD PRESSURE: 124 MMHG | RESPIRATION RATE: 16 BRPM | BODY MASS INDEX: 33.11 KG/M2

## 2025-06-05 DIAGNOSIS — F51.01 PRIMARY INSOMNIA: Primary | ICD-10-CM

## 2025-06-05 PROCEDURE — 3288F FALL RISK ASSESSMENT DOCD: CPT | Mod: CPTII,HCNC,S$GLB, | Performed by: NURSE PRACTITIONER

## 2025-06-05 PROCEDURE — 99213 OFFICE O/P EST LOW 20 MIN: CPT | Mod: HCNC,S$GLB,, | Performed by: NURSE PRACTITIONER

## 2025-06-05 PROCEDURE — 1101F PT FALLS ASSESS-DOCD LE1/YR: CPT | Mod: CPTII,HCNC,S$GLB, | Performed by: NURSE PRACTITIONER

## 2025-06-05 PROCEDURE — 1126F AMNT PAIN NOTED NONE PRSNT: CPT | Mod: CPTII,HCNC,S$GLB, | Performed by: NURSE PRACTITIONER

## 2025-06-05 PROCEDURE — 3074F SYST BP LT 130 MM HG: CPT | Mod: CPTII,HCNC,S$GLB, | Performed by: NURSE PRACTITIONER

## 2025-06-05 PROCEDURE — 3008F BODY MASS INDEX DOCD: CPT | Mod: CPTII,HCNC,S$GLB, | Performed by: NURSE PRACTITIONER

## 2025-06-05 PROCEDURE — 1159F MED LIST DOCD IN RCRD: CPT | Mod: CPTII,HCNC,S$GLB, | Performed by: NURSE PRACTITIONER

## 2025-06-05 PROCEDURE — 3079F DIAST BP 80-89 MM HG: CPT | Mod: CPTII,HCNC,S$GLB, | Performed by: NURSE PRACTITIONER

## 2025-06-05 PROCEDURE — 99999 PR PBB SHADOW E&M-EST. PATIENT-LVL IV: CPT | Mod: PBBFAC,HCNC,, | Performed by: NURSE PRACTITIONER

## 2025-06-05 PROCEDURE — 1160F RVW MEDS BY RX/DR IN RCRD: CPT | Mod: CPTII,HCNC,S$GLB, | Performed by: NURSE PRACTITIONER

## 2025-06-05 RX ORDER — TRAZODONE HYDROCHLORIDE 50 MG/1
50-100 TABLET ORAL NIGHTLY
Qty: 60 TABLET | Refills: 5 | Status: SHIPPED | OUTPATIENT
Start: 2025-06-05